# Patient Record
Sex: FEMALE | Race: OTHER
[De-identification: names, ages, dates, MRNs, and addresses within clinical notes are randomized per-mention and may not be internally consistent; named-entity substitution may affect disease eponyms.]

---

## 2013-06-06 VITALS — TEMPERATURE: 97.8 F | TEMPERATURE: 97.8 F

## 2017-03-05 ENCOUNTER — HOSPITAL ENCOUNTER (EMERGENCY)
Dept: HOSPITAL 58 - ED | Age: 47
Discharge: HOME | End: 2017-03-05

## 2017-03-05 VITALS — BODY MASS INDEX: 29.2 KG/M2

## 2017-03-05 VITALS — SYSTOLIC BLOOD PRESSURE: 123 MMHG | DIASTOLIC BLOOD PRESSURE: 86 MMHG | TEMPERATURE: 99.5 F

## 2017-03-05 DIAGNOSIS — J02.0: Primary | ICD-10-CM

## 2017-03-05 DIAGNOSIS — I88.9: ICD-10-CM

## 2017-03-05 LAB
FLU INTERNAL QC: (no result)
FLUAV AG NPH QL: NEGATIVE
FLUBV AG NPH QL: NEGATIVE

## 2017-03-05 PROCEDURE — 87804 INFLUENZA ASSAY W/OPTIC: CPT

## 2017-03-05 PROCEDURE — 99282 EMERGENCY DEPT VISIT SF MDM: CPT

## 2017-03-05 PROCEDURE — 87880 STREP A ASSAY W/OPTIC: CPT

## 2017-03-05 NOTE — ED.PDOC
General


ED Provider: 


Dr. JENNIFER YOUNG JR





Chief Complaint: Sore Throat


Stated Complaint: sore throat started yesterday[End]Sore throat since yesterday

, fever 99.5 90 16 97% 123/86 2/10


Time Seen by Physician: 09:39


Mode of Arrival: Walk-In


Information Source: Patient


Exam Limitations: No limitations


Primary Care Provider: 


DRAKE NICHOLSON





Nursing and Triage Documentation Reviewed and Agree: No





EENT Complaint Exam





- Throat Complaint/Exam


Onset/Duration: 1 day 


Symptoms Are: Still present


Timimg: Constant


Initial Severity: Moderate


Current Severity: Moderate


Aggravating: Reports: None


Alleviating: Reports: None


Associated Signs and Symptoms: Reports: Fever (right neck node)


Related History: Reports: Similar Episode


Uvula Midline: Yes


Exanthem: Present: Pharynx


Stridor Present: No


Sinus Tenderness Present: No


Tonsillar Hypertrophy Present: No


Tonsillar Exudate Present: No


Tamanna-tonsillar Swelling Present: No


Adenopathy Present: Yes


Splenomegaly Present: No


Differential Diagnoses: Pharyngitis





Review of Systems





- Review Of Systems


Constitutional: Reports: Fever, Malaise, Weakness


Eyes: Reports: No symptoms


Ears, Nose, Mouth, Throat: Reports: Mouth pain, Throat pain


Respiratory: Reports: No symptoms


Cardiac: Reports: No symptoms


GI: Reports: No symptoms


: Reports: No symptoms


Musculoskeletal: Reports: No symptoms


Neurological: Reports: No symptoms, Weakness


Endocrine: Reports: No symptoms


Hematologic/Lymphatic: Reports: No symptoms


All Other Systems: Other





Past Medical History





- Past Medical History


Endocrine: Reports: None


Cardiovascular: Reports: None


Respiratory: Reports: None


Hematological: Reports: None


Gastrointestinal: Reports: None


Genitourinary: Reports: None


Neuro/Psych: Reports: Anxiety, Depression


Musculoskeletal: Reports: None


Cancer: Reports: None


Last Menstrual Period: february





- Surgical History


General Surgical History: Reports: Unknown





- Family History


Family History: Reports: Unknown





- Social History


Smoking Status: Never smoker


Hx Substance Use: No


Alcohol Screening: None





- Immunizations


Tetanus Shot up to Date: No





Physical Exam





- Physical Exam


Appearance: Ill-appearing


Ill-appearing: Moderate


Pain Distress: Moderate


Eyes: DANIELLA, EOMI, Conjunctiva clear


ENT: Ears normal, Nose normal, Erythema


Neck: Supple (right submandibular node-tender)


Respiratory: Airway patent, Breath sounds clear, Breath sounds equal, 

Respirations nonlabored


Cardiovascular: RRR, Pulses normal, No rub, No murmur


GI/: Soft, Nontender, No masses, Bowel sounds normal, No Organomegaly


Musculoskeletal: Normal strength, ROM intact, No edema, No calf tenderness


Skin: Warm, Dry, Normal color


Neurological: Sensation intact, Motor intact, Reflexes intact, Cranial nerves 

intact, Alert, Oriented


Psychiatric: Affect appropriate, Mood appropriate





Critical Care Note





- Critical Care Note


Total Time (mins): 0





Course





- Course


Orders, Labs, Meds: 





Orders











 Category Date Time Status


 


 RAPID FLU A/B Stat LAB  03/04/17 09:20 Received


 


 STREP SCREEN Stat LAB  03/04/17 09:20 Received











Vital Signs: 





 











  Temp Pulse Resp BP Pulse Ox


 


 03/05/17 09:05  99.5 F  90  16  123/86  97














Departure





- Departure


Time of Disposition: 09:49


Disposition: HOME SELF-CARE


Discharge Problem: 


 Sore throat symptom, Lymphadenitis, Strep pharyngitis





Instructions:  Lymphadenopathy (ED), Strep Throat (ED)


Condition: Good


Pt referred to PMD for follow-up: Yes


Additional Instructions: 


Do not share food or drink 


avoid children until on antibiotic for full 24 hours


recheck PMD two weeks


antibiotic until gone


clindamycin antiobiotic


return if worse


Prescriptions: 


Naproxen [Naprosyn] 500 mg PO Q12HR PRN #30 tablet


 PRN Reason: PAIN


Clindamycin HCl 300 mg PO QID #40 capsule


Allergies/Adverse Reactions: 


Allergies





Penicillins Adverse Reaction (Verified 02/08/16 01:38)


 








Home Medications: 


Ambulatory Orders





Sertraline HCl [Zoloft] 25 mg PO DAILY 06/06/13 


Clindamycin HCl 300 mg PO QID #40 capsule 03/05/17 


Naproxen [Naprosyn] 500 mg PO Q12HR PRN #30 tablet 03/05/17

## 2017-05-01 ENCOUNTER — HOSPITAL ENCOUNTER (OUTPATIENT)
Dept: HOSPITAL 58 - LAB | Age: 47
Discharge: HOME | End: 2017-05-01
Attending: OPTOMETRIST

## 2017-05-01 VITALS — BODY MASS INDEX: 29.2 KG/M2

## 2017-05-01 DIAGNOSIS — H11.31: Primary | ICD-10-CM

## 2017-05-01 LAB
ALT SERPL-CCNC: 21 U/L (ref 12–78)
APTT PPP: 23.8 SEC (ref 23.9–40)
AST SERPL-CCNC: 16 U/L (ref 15–37)
BASOPHILS # BLD AUTO: 0.1 K/UL (ref 0–0.2)
BASOPHILS NFR BLD AUTO: 0.7 % (ref 0–3)
EOSINOPHIL # BLD AUTO: 0.1 K/UL (ref 0–0.7)
EOSINOPHIL NFR BLD AUTO: 1.7 % (ref 0–7)
HCT VFR BLD AUTO: 40.1 % (ref 37–47)
HGB BLD-MCNC: 13.5 G/DL (ref 12–16)
IMM GRANULOCYTES NFR BLD AUTO: 0.4 % (ref 0–5)
LYMPHOCYTES # SPEC AUTO: 2.3 K/UL (ref 0.6–3.4)
LYMPHOCYTES NFR BLD AUTO: 30.1 % (ref 10–50)
MCH RBC QN: 28.8 PG (ref 27–31)
MCHC RBC AUTO-ENTMCNC: 33.7 G/DL (ref 31.8–35.4)
MCV RBC: 85.5 FL (ref 81–99)
MONOCYTES # BLD AUTO: 0.6 K/UL (ref 0.4–2)
MONOCYTES NFR BLD AUTO: 7.6 % (ref 0–10)
NEUTROPHILS # BLD AUTO: 4.6 K/UL (ref 2–6.9)
NEUTROPHILS NFR BLD AUTO: 59.5 %
PLATELET # BLD AUTO: 206 10^3/UL (ref 140–440)
PROTHROMBIN TIME: 10.1 SEC (ref 9.3–11)
RBC # BLD AUTO: 4.69 10^6/UL (ref 4.2–5.4)
WBC # BLD AUTO: 7.64 K/UL (ref 4.6–10.2)

## 2017-05-01 PROCEDURE — 85302 CLOT INHIBIT PROT C ANTIGEN: CPT

## 2017-05-01 PROCEDURE — 85025 COMPLETE CBC W/AUTO DIFF WBC: CPT

## 2017-05-01 PROCEDURE — 85610 PROTHROMBIN TIME: CPT

## 2017-05-01 PROCEDURE — 85306 CLOT INHIBIT PROT S FREE: CPT

## 2017-05-01 PROCEDURE — 84450 TRANSFERASE (AST) (SGOT): CPT

## 2017-05-01 PROCEDURE — 36415 COLL VENOUS BLD VENIPUNCTURE: CPT

## 2017-05-01 PROCEDURE — 85305 CLOT INHIBIT PROT S TOTAL: CPT

## 2017-05-01 PROCEDURE — 84460 ALANINE AMINO (ALT) (SGPT): CPT

## 2017-05-01 PROCEDURE — 85730 THROMBOPLASTIN TIME PARTIAL: CPT

## 2017-05-03 LAB
PROT C AG ACT/NOR PPP IA: 76 % (ref 60–150)
PROT S AG ACT/NOR PPP IA: 136 % (ref 60–150)
PROT S FREE AG PPP IA-ACNC: 66 % (ref 57–157)

## 2017-06-12 RX ORDER — SERTRALINE HYDROCHLORIDE 25 MG/1
25 TABLET, FILM COATED ORAL DAILY
Qty: 30 TABLET | Refills: 0 | Status: SHIPPED | OUTPATIENT
Start: 2017-06-12 | End: 2017-06-26 | Stop reason: SDUPTHER

## 2017-06-26 ENCOUNTER — OFFICE VISIT (OUTPATIENT)
Dept: FAMILY MEDICINE CLINIC | Facility: CLINIC | Age: 47
End: 2017-06-26

## 2017-06-26 VITALS
OXYGEN SATURATION: 98 % | SYSTOLIC BLOOD PRESSURE: 120 MMHG | BODY MASS INDEX: 26.58 KG/M2 | WEIGHT: 150 LBS | HEIGHT: 63 IN | RESPIRATION RATE: 16 BRPM | HEART RATE: 62 BPM | DIASTOLIC BLOOD PRESSURE: 82 MMHG

## 2017-06-26 DIAGNOSIS — H53.9 VISUAL CHANGES: Primary | ICD-10-CM

## 2017-06-26 DIAGNOSIS — F32.89 OTHER DEPRESSION: ICD-10-CM

## 2017-06-26 PROBLEM — F32.A DEPRESSION: Status: ACTIVE | Noted: 2017-06-26

## 2017-06-26 PROCEDURE — 99213 OFFICE O/P EST LOW 20 MIN: CPT | Performed by: FAMILY MEDICINE

## 2017-06-26 RX ORDER — SERTRALINE HYDROCHLORIDE 25 MG/1
25 TABLET, FILM COATED ORAL DAILY
Qty: 30 TABLET | Refills: 5 | Status: SHIPPED | OUTPATIENT
Start: 2017-06-26 | End: 2018-03-23 | Stop reason: SDUPTHER

## 2017-06-26 NOTE — PROGRESS NOTES
"Subjective   Haley Jewell is a 46 y.o. female.     Chief Complaint   Patient presents with   • Eye Problem     pt states that when she is outside for long periods of time her eye gets very red and bloodshot.  she says that she has been to the eye doctor with no result.     • Med Refill     zoloft   • Insomnia     trouble sleeping        History of Present Illness     she is noting trouble with recurrent eye pain--she has been to [facility] for the same problem and had labs--she will devloped eye pain with redness..they did not find cause--she is tolerating zoloft for deprssion and sleeping well ..she was having trrouble sleeping from the eye pain      Current Outpatient Prescriptions:   •  sertraline (ZOLOFT) 25 MG tablet, Take 1 tablet by mouth Daily., Disp: 30 tablet, Rfl: 5  No Known Allergies    No past medical history on file.  No past surgical history on file.    Review of Systems   Constitutional: Negative.    HENT: Negative.    Eyes: Positive for pain and redness.   Respiratory: Negative.    Cardiovascular: Negative.    Gastrointestinal: Negative.    Genitourinary: Negative.    Musculoskeletal: Negative.    Skin: Negative.    Allergic/Immunologic: Negative.    Neurological: Negative.    Hematological: Negative.    Psychiatric/Behavioral: Positive for decreased concentration. Negative for self-injury and suicidal ideas.       Objective  /82  Pulse 62  Resp 16  Ht 63\" (160 cm)  Wt 150 lb (68 kg)  SpO2 98%  BMI 26.57 kg/m2  Physical Exam   Constitutional: She is oriented to person, place, and time. She appears well-developed and well-nourished.   HENT:   Head: Normocephalic and atraumatic.   Right Ear: External ear normal.   Left Ear: External ear normal.   Nose: Nose normal.   Mouth/Throat: Oropharynx is clear and moist.   Eyes: Conjunctivae and EOM are normal. Pupils are equal, round, and reactive to light.   Neck: Normal range of motion. Neck supple.   Cardiovascular: Normal rate, " regular rhythm, normal heart sounds and intact distal pulses.    Pulmonary/Chest: Effort normal and breath sounds normal.   Abdominal: Soft. Bowel sounds are normal.   Musculoskeletal: Normal range of motion.   Neurological: She is alert and oriented to person, place, and time. She has normal reflexes.   Skin: Skin is warm and dry.   Psychiatric: She has a normal mood and affect. Her behavior is normal. Judgment and thought content normal.   Nursing note and vitals reviewed.      Assessment/Plan   Haley was seen today for eye problem, med refill and insomnia.    Diagnoses and all orders for this visit:    Visual changes    Other depression    i suggested seeing dr fuentes for evaluation she is calling today           No orders of the defined types were placed in this encounter.      Follow up: 6 month(s)

## 2017-07-05 ENCOUNTER — OFFICE VISIT (OUTPATIENT)
Dept: FAMILY MEDICINE CLINIC | Facility: CLINIC | Age: 47
End: 2017-07-05

## 2017-07-05 VITALS
WEIGHT: 150 LBS | HEART RATE: 69 BPM | HEIGHT: 63 IN | BODY MASS INDEX: 26.58 KG/M2 | DIASTOLIC BLOOD PRESSURE: 84 MMHG | RESPIRATION RATE: 16 BRPM | OXYGEN SATURATION: 98 % | SYSTOLIC BLOOD PRESSURE: 126 MMHG

## 2017-07-05 DIAGNOSIS — L98.9 SKIN LESION OF LEFT ARM: Primary | ICD-10-CM

## 2017-07-05 DIAGNOSIS — D23.9 DERMATOFIBROMA: Primary | ICD-10-CM

## 2017-07-05 PROCEDURE — 11300 SHAVE SKIN LESION 0.5 CM/<: CPT | Performed by: FAMILY MEDICINE

## 2017-07-05 NOTE — PROGRESS NOTES
Haley has a darkening papular l;esion on the left forearm she desires removal--she understands risk of bleeding and infection but desire removal and consents--using 2cc of lidocaine without epi anesthesia is obtained--the 0.5cm lesion is removed in a shave fashion without borders--hemostasis was obtained uising electrocautery ..she tolerated it well--sterile dressing is applied and she is instructed in wound care--she will call in one week for path report

## 2017-07-07 LAB
DX ICD CODE: NORMAL
DX ICD CODE: NORMAL
PATH REPORT.FINAL DX SPEC: NORMAL
PATH REPORT.GROSS SPEC: NORMAL
PATH REPORT.RELEVANT HX SPEC: NORMAL
PATH REPORT.SITE OF ORIGIN SPEC: NORMAL
PATHOLOGIST NAME: NORMAL
PAYMENT PROCEDURE: NORMAL

## 2017-09-11 ENCOUNTER — APPOINTMENT (OUTPATIENT)
Dept: GENERAL RADIOLOGY | Facility: HOSPITAL | Age: 47
End: 2017-09-11

## 2017-09-11 ENCOUNTER — HOSPITAL ENCOUNTER (EMERGENCY)
Facility: HOSPITAL | Age: 47
Discharge: HOME | End: 2017-09-11
Admitting: EMERGENCY MEDICINE

## 2017-09-11 ENCOUNTER — APPOINTMENT (OUTPATIENT)
Dept: CT IMAGING | Facility: HOSPITAL | Age: 47
End: 2017-09-11

## 2017-09-11 VITALS
HEART RATE: 67 BPM | RESPIRATION RATE: 16 BRPM | HEIGHT: 60 IN | WEIGHT: 153.1 LBS | BODY MASS INDEX: 30.06 KG/M2 | DIASTOLIC BLOOD PRESSURE: 64 MMHG | SYSTOLIC BLOOD PRESSURE: 108 MMHG | OXYGEN SATURATION: 97 % | TEMPERATURE: 98 F

## 2017-09-11 DIAGNOSIS — H81.12 BPV (BENIGN POSITIONAL VERTIGO), LEFT: Primary | ICD-10-CM

## 2017-09-11 DIAGNOSIS — R01.1 CARDIAC MURMUR: ICD-10-CM

## 2017-09-11 LAB
ALBUMIN SERPL-MCNC: 4.6 G/DL (ref 3.5–5)
ALBUMIN/GLOB SERPL: 1.1 G/DL (ref 1.1–2.5)
ALP SERPL-CCNC: 89 U/L (ref 24–120)
ALT SERPL W P-5'-P-CCNC: 37 U/L (ref 0–54)
ANION GAP SERPL CALCULATED.3IONS-SCNC: 11 MMOL/L (ref 4–13)
AST SERPL-CCNC: 28 U/L (ref 7–45)
BACTERIA UR QL AUTO: ABNORMAL /HPF
BASOPHILS # BLD AUTO: 0.04 10*3/MM3 (ref 0–0.2)
BASOPHILS NFR BLD AUTO: 0.4 % (ref 0–2)
BILIRUB SERPL-MCNC: 0.5 MG/DL (ref 0.1–1)
BILIRUB UR QL STRIP: NEGATIVE
BUN BLD-MCNC: 10 MG/DL (ref 5–21)
BUN/CREAT SERPL: 17.5 (ref 7–25)
CALCIUM SPEC-SCNC: 9.4 MG/DL (ref 8.4–10.4)
CHLORIDE SERPL-SCNC: 108 MMOL/L (ref 98–110)
CLARITY UR: CLEAR
CO2 SERPL-SCNC: 23 MMOL/L (ref 24–31)
COLOR UR: YELLOW
CREAT BLD-MCNC: 0.57 MG/DL (ref 0.5–1.4)
DEPRECATED RDW RBC AUTO: 42.3 FL (ref 40–54)
EOSINOPHIL # BLD AUTO: 0.12 10*3/MM3 (ref 0–0.7)
EOSINOPHIL NFR BLD AUTO: 1.3 % (ref 0–4)
ERYTHROCYTE [DISTWIDTH] IN BLOOD BY AUTOMATED COUNT: 13.5 % (ref 12–15)
GFR SERPL CREATININE-BSD FRML MDRD: 115 ML/MIN/1.73
GFR SERPL CREATININE-BSD FRML MDRD: 140 ML/MIN/1.73
GLOBULIN UR ELPH-MCNC: 4.2 GM/DL
GLUCOSE BLD-MCNC: 92 MG/DL (ref 70–100)
GLUCOSE UR STRIP-MCNC: NEGATIVE MG/DL
HCG SERPL QL: NEGATIVE
HCT VFR BLD AUTO: 41.9 % (ref 37–47)
HGB BLD-MCNC: 13.9 G/DL (ref 12–16)
HGB UR QL STRIP.AUTO: NEGATIVE
HYALINE CASTS UR QL AUTO: ABNORMAL /LPF
IMM GRANULOCYTES # BLD: 0.03 10*3/MM3 (ref 0–0.03)
IMM GRANULOCYTES NFR BLD: 0.3 % (ref 0–5)
KETONES UR QL STRIP: NEGATIVE
LEUKOCYTE ESTERASE UR QL STRIP.AUTO: ABNORMAL
LYMPHOCYTES # BLD AUTO: 2.37 10*3/MM3 (ref 0.72–4.86)
LYMPHOCYTES NFR BLD AUTO: 26.3 % (ref 15–45)
MCH RBC QN AUTO: 28.7 PG (ref 28–32)
MCHC RBC AUTO-ENTMCNC: 33.2 G/DL (ref 33–36)
MCV RBC AUTO: 86.4 FL (ref 82–98)
MONOCYTES # BLD AUTO: 0.61 10*3/MM3 (ref 0.19–1.3)
MONOCYTES NFR BLD AUTO: 6.8 % (ref 4–12)
NEUTROPHILS # BLD AUTO: 5.84 10*3/MM3 (ref 1.87–8.4)
NEUTROPHILS NFR BLD AUTO: 64.9 % (ref 39–78)
NITRITE UR QL STRIP: NEGATIVE
PH UR STRIP.AUTO: 7 [PH] (ref 5–8)
PLATELET # BLD AUTO: 223 10*3/MM3 (ref 130–400)
PMV BLD AUTO: 12.7 FL (ref 6–12)
POTASSIUM BLD-SCNC: 3.9 MMOL/L (ref 3.5–5.3)
PROT SERPL-MCNC: 8.8 G/DL (ref 6.3–8.7)
PROT UR QL STRIP: NEGATIVE
RBC # BLD AUTO: 4.85 10*6/MM3 (ref 4.2–5.4)
RBC # UR: ABNORMAL /HPF
REF LAB TEST METHOD: ABNORMAL
SODIUM BLD-SCNC: 142 MMOL/L (ref 135–145)
SP GR UR STRIP: 1.01 (ref 1–1.03)
SQUAMOUS #/AREA URNS HPF: ABNORMAL /HPF
T3FREE SERPL-MCNC: 3.39 PG/ML (ref 2.77–5.27)
T4 FREE SERPL-MCNC: 1.1 NG/DL (ref 0.78–2.19)
TROPONIN I SERPL-MCNC: <0.012 NG/ML (ref 0–0.03)
TSH SERPL DL<=0.05 MIU/L-ACNC: 1.24 MIU/ML (ref 0.47–4.68)
UROBILINOGEN UR QL STRIP: ABNORMAL
WBC NRBC COR # BLD: 9.01 10*3/MM3 (ref 4.8–10.8)
WBC UR QL AUTO: ABNORMAL /HPF

## 2017-09-11 PROCEDURE — 85025 COMPLETE CBC W/AUTO DIFF WBC: CPT | Performed by: EMERGENCY MEDICINE

## 2017-09-11 PROCEDURE — 87086 URINE CULTURE/COLONY COUNT: CPT | Performed by: EMERGENCY MEDICINE

## 2017-09-11 PROCEDURE — 25010000002 ONDANSETRON PER 1 MG: Performed by: EMERGENCY MEDICINE

## 2017-09-11 PROCEDURE — 70450 CT HEAD/BRAIN W/O DYE: CPT

## 2017-09-11 PROCEDURE — 84484 ASSAY OF TROPONIN QUANT: CPT | Performed by: EMERGENCY MEDICINE

## 2017-09-11 PROCEDURE — 84703 CHORIONIC GONADOTROPIN ASSAY: CPT | Performed by: EMERGENCY MEDICINE

## 2017-09-11 PROCEDURE — 71010 HC CHEST PA OR AP: CPT

## 2017-09-11 PROCEDURE — 25010000002 DIAZEPAM PER 5 MG: Performed by: EMERGENCY MEDICINE

## 2017-09-11 PROCEDURE — 25010000002 METHYLPREDNISOLONE PER 125 MG: Performed by: PHYSICIAN ASSISTANT

## 2017-09-11 PROCEDURE — 93010 ELECTROCARDIOGRAM REPORT: CPT | Performed by: INTERNAL MEDICINE

## 2017-09-11 PROCEDURE — 93005 ELECTROCARDIOGRAM TRACING: CPT | Performed by: EMERGENCY MEDICINE

## 2017-09-11 PROCEDURE — 96374 THER/PROPH/DIAG INJ IV PUSH: CPT

## 2017-09-11 PROCEDURE — 80053 COMPREHEN METABOLIC PANEL: CPT | Performed by: EMERGENCY MEDICINE

## 2017-09-11 PROCEDURE — 81001 URINALYSIS AUTO W/SCOPE: CPT | Performed by: EMERGENCY MEDICINE

## 2017-09-11 PROCEDURE — 84443 ASSAY THYROID STIM HORMONE: CPT | Performed by: PHYSICIAN ASSISTANT

## 2017-09-11 PROCEDURE — 96375 TX/PRO/DX INJ NEW DRUG ADDON: CPT

## 2017-09-11 PROCEDURE — 99284 EMERGENCY DEPT VISIT MOD MDM: CPT

## 2017-09-11 PROCEDURE — 84481 FREE ASSAY (FT-3): CPT | Performed by: PHYSICIAN ASSISTANT

## 2017-09-11 PROCEDURE — 84439 ASSAY OF FREE THYROXINE: CPT | Performed by: PHYSICIAN ASSISTANT

## 2017-09-11 RX ORDER — METHYLPREDNISOLONE SODIUM SUCCINATE 125 MG/2ML
125 INJECTION, POWDER, LYOPHILIZED, FOR SOLUTION INTRAMUSCULAR; INTRAVENOUS ONCE
Status: COMPLETED | OUTPATIENT
Start: 2017-09-11 | End: 2017-09-11

## 2017-09-11 RX ORDER — METHYLPREDNISOLONE 4 MG/1
TABLET ORAL
Qty: 21 EACH | Refills: 0 | Status: SHIPPED | OUTPATIENT
Start: 2017-09-11 | End: 2018-01-11

## 2017-09-11 RX ORDER — METHYLPREDNISOLONE SODIUM SUCCINATE 125 MG/2ML
125 INJECTION, POWDER, LYOPHILIZED, FOR SOLUTION INTRAMUSCULAR; INTRAVENOUS ONCE
Status: DISCONTINUED | OUTPATIENT
Start: 2017-09-11 | End: 2017-09-11

## 2017-09-11 RX ORDER — MECLIZINE HYDROCHLORIDE 25 MG/1
25 TABLET ORAL 3 TIMES DAILY PRN
Qty: 90 TABLET | Refills: 0 | Status: SHIPPED | OUTPATIENT
Start: 2017-09-11 | End: 2018-08-21

## 2017-09-11 RX ORDER — DIAZEPAM 5 MG/ML
5 INJECTION, SOLUTION INTRAMUSCULAR; INTRAVENOUS ONCE
Status: COMPLETED | OUTPATIENT
Start: 2017-09-11 | End: 2017-09-11

## 2017-09-11 RX ORDER — ONDANSETRON 2 MG/ML
4 INJECTION INTRAMUSCULAR; INTRAVENOUS ONCE
Status: COMPLETED | OUTPATIENT
Start: 2017-09-11 | End: 2017-09-11

## 2017-09-11 RX ORDER — DIAZEPAM 5 MG/1
5 TABLET ORAL EVERY 6 HOURS PRN
Qty: 15 TABLET | Refills: 0 | Status: SHIPPED | OUTPATIENT
Start: 2017-09-11 | End: 2018-08-21

## 2017-09-11 RX ADMIN — METHYLPREDNISOLONE SODIUM SUCCINATE 125 MG: 125 INJECTION, POWDER, FOR SOLUTION INTRAMUSCULAR; INTRAVENOUS at 10:54

## 2017-09-11 RX ADMIN — DIAZEPAM 5 MG: 5 INJECTION, SOLUTION INTRAMUSCULAR; INTRAVENOUS at 10:45

## 2017-09-11 RX ADMIN — ONDANSETRON 4 MG: 2 INJECTION INTRAMUSCULAR; INTRAVENOUS at 10:39

## 2017-09-11 NOTE — ED PROVIDER NOTES
"Subjective   Patient is a 46 y.o. female presenting with dizziness.   Dizziness     Patient is a pleasant 46-year-old  female with chief complaint of dizziness and nausea.  Her  and daughter presents to ED with her.  She speaks fair English as well and her daughter translates.  For the past 5 days, she has felt vertiginous symptoms when she ambulates and particularly when she lays on her left side.  She has hadnausea but has not progressed to vomiting.  The patient feels fine whenever she lies still.  The patient describes it very difficult for her to walk whenever she is feeling dizzy.  She has had similar presentations intermittently for the past several years.  Her last major episode was 2 years ago.  She had seen a specialist in the past and was taught the Paolo-Hallpike maneuver.  She describes that usually resolves her symptoms.  However, her symptoms have worsened despite this.    The patient denies any fever.  She denies any diplopia or going blind.  She complains of several near syncopal episodes.  She denies any recent illnesses or head congestion the past month.  She denies new cough.  She denies any chest pain, pressure, or tightness.  She denies shortness of breath.  Aside from her Zoloft, she is not taking any other medications.    The patient has completed a stress test 2 years ago as ordered by her primary care physician.  She describes the results were normal.  This was completed due to \"an anxiety attack.\"    Review of Systems   Constitutional: Positive for activity change, appetite change and fatigue. Negative for chills, diaphoresis and fever.   HENT: Negative.    Eyes: Negative.    Respiratory: Negative.  Negative for cough, chest tightness and stridor.    Cardiovascular: Negative.    Gastrointestinal: Negative.    Genitourinary: Negative.    Musculoskeletal: Positive for gait problem.   Neurological: Positive for dizziness.   Psychiatric/Behavioral: Negative.    All other systems " "reviewed and are negative.      Past Medical History:   Diagnosis Date   • Depression    • Vertigo        Allergies   Allergen Reactions   • Penicillins        History reviewed. No pertinent surgical history.    History reviewed. No pertinent family history.    Social History     Social History   • Marital status:      Spouse name: N/A   • Number of children: N/A   • Years of education: N/A     Social History Main Topics   • Smoking status: Never Smoker   • Smokeless tobacco: None   • Alcohol use No   • Drug use: No   • Sexual activity: Not Asked     Other Topics Concern   • None     Social History Narrative   • None       Prior to Admission medications    Medication Sig Start Date End Date Taking? Authorizing Provider   sertraline (ZOLOFT) 25 MG tablet Take 1 tablet by mouth Daily. 6/26/17   Harvey Mora MD       Medications   ondansetron (ZOFRAN) injection 4 mg (4 mg Intravenous Given 9/11/17 1039)   diazePAM (VALIUM) injection 5 mg (5 mg Intravenous Given 9/11/17 1045)   methylPREDNISolone sodium succinate (SOLU-Medrol) injection 125 mg (125 mg Intravenous Given 9/11/17 1054)       /88 (BP Location: Right arm, Patient Position: Lying)  Pulse 94  Temp 97.5 °F (36.4 °C)  Resp 21  Ht 60\" (152.4 cm)  Wt 153 lb 1.6 oz (69.4 kg)  SpO2 100%  BMI 29.9 kg/m2      Objective   Physical Exam   Constitutional: She is oriented to person, place, and time. She appears well-developed and well-nourished.  Non-toxic appearance. No distress.   HENT:   Head: Normocephalic and atraumatic.   Nose: Nose normal.   Mouth/Throat: Uvula is midline, oropharynx is clear and moist and mucous membranes are normal.   Eyes: Conjunctivae, EOM and lids are normal. Pupils are equal, round, and reactive to light. Lids are everted and swept, no foreign bodies found.   Neck: Trachea normal, normal range of motion, full passive range of motion without pain and phonation normal. Neck supple. Normal carotid pulses and no JVD " present. Carotid bruit is not present. No rigidity.   Cardiovascular: Normal rate, regular rhythm, intact distal pulses and normal pulses.    Murmur heard.   Systolic murmur is present with a grade of 2/6   Pulmonary/Chest: Effort normal and breath sounds normal. No stridor. No apnea and no tachypnea. No respiratory distress.   Abdominal: Soft. Normal appearance, normal aorta and bowel sounds are normal. There is no hepatosplenomegaly. No hernia.   Musculoskeletal: Normal range of motion.       Vascular Status -  Her exam exhibits right foot vasculature normal. Her exam exhibits no right foot edema. Her exam exhibits left foot vasculature normal. Her exam exhibits no left foot edema.  Neurological: She is alert and oriented to person, place, and time. She has normal strength and normal reflexes. She displays normal reflexes. No cranial nerve deficit or sensory deficit. Coordination and gait normal. GCS eye subscore is 4. GCS verbal subscore is 5. GCS motor subscore is 6.   Cranial nerve evaluation:  No aphasia.  PERRLA. Normal visual fields. Normal smooth eye movement.   No facial assymetry.  Tongue is midline with normal gag reflex.   Symmetrical/normal sternocleidomastoid movement.   No pronator drift.  No sensory deficits.  No motor deficits.   Symptomatic when moving her head to the left.   No ataxia.    Skin: Skin is warm, dry and intact. She is not diaphoretic. No pallor.   Psychiatric: She has a normal mood and affect. Her speech is normal and behavior is normal.   Nursing note and vitals reviewed.      Procedures         Lab Results (last 24 hours)     Procedure Component Value Units Date/Time    Urinalysis With / Culture If Indicated [[id number]]  (Abnormal) Collected:  09/11/17 1025    Specimen:  Urine from Urine, Clean Catch Updated:  09/11/17 1109     Color, UA Yellow     Appearance, UA Clear     pH, UA 7.0     Specific Gravity, UA 1.007     Glucose, UA Negative     Ketones, UA Negative     Bilirubin, UA  Negative     Blood, UA Negative     Protein, UA Negative     Leuk Esterase, UA Small (1+) (A)     Nitrite, UA Negative     Urobilinogen, UA 0.2 E.U./dL    Urine Culture [[id number]] Collected:  09/11/17 1025    Specimen:  Urine from Urine, Clean Catch Updated:  09/11/17 1106    Urinalysis, Microscopic Only [[id number]]  (Abnormal) Collected:  09/11/17 1025    Specimen:  Urine from Urine, Clean Catch Updated:  09/11/17 1109     RBC, UA 0-2 (A) /HPF      WBC, UA 6-12 (A) /HPF      Bacteria, UA 1+ (A) /HPF      Squamous Epithelial Cells, UA 3-6 (A) /HPF      Hyaline Casts, UA 3-6 /LPF      Methodology Automated Microscopy    CBC & Differential [[id number]] Collected:  09/11/17 1030    Specimen:  Blood Updated:  09/11/17 1107    Narrative:       The following orders were created for panel order CBC & Differential.  Procedure                               Abnormality         Status                     ---------                               -----------         ------                     CBC Auto Differential[[id number]]        Abnormal            Final result                 Please view results for these tests on the individual orders.    Comprehensive Metabolic Panel [[id number]]  (Abnormal) Collected:  09/11/17 1030    Specimen:  Blood from Arm, Left Updated:  09/11/17 1119     Glucose 92 mg/dL      BUN 10 mg/dL      Creatinine 0.57 mg/dL      Sodium 142 mmol/L      Potassium 3.9 mmol/L      Chloride 108 mmol/L      CO2 23.0 (L) mmol/L      Calcium 9.4 mg/dL      Total Protein 8.8 (H) g/dL      Albumin 4.60 g/dL      ALT (SGPT) 37 U/L      AST (SGOT) 28 U/L      Alkaline Phosphatase 89 U/L      Total Bilirubin 0.5 mg/dL      eGFR Non African Amer 115 mL/min/1.73      eGFR  African Amer 140 mL/min/1.73      Globulin 4.2 gm/dL      A/G Ratio 1.1 g/dL      BUN/Creatinine Ratio 17.5     Anion Gap 11.0 mmol/L     Troponin [[id number]]  (Normal) Collected:  09/11/17 1030    Specimen:  Blood from Arm, Left Updated:  09/11/17 1128      Troponin I <0.012 ng/mL     hCG, Serum, Qualitative [[id number]]  (Normal) Collected:  09/11/17 1030    Specimen:  Blood from Arm, Left Updated:  09/11/17 1113     HCG Qualitative Negative    CBC Auto Differential [[id number]]  (Abnormal) Collected:  09/11/17 1030    Specimen:  Blood from Arm, Left Updated:  09/11/17 1107     WBC 9.01 10*3/mm3      RBC 4.85 10*6/mm3      Hemoglobin 13.9 g/dL      Hematocrit 41.9 %      MCV 86.4 fL      MCH 28.7 pg      MCHC 33.2 g/dL      RDW 13.5 %      RDW-SD 42.3 fl      MPV 12.7 (H) fL      Platelets 223 10*3/mm3      Neutrophil % 64.9 %      Lymphocyte % 26.3 %      Monocyte % 6.8 %      Eosinophil % 1.3 %      Basophil % 0.4 %      Immature Grans % 0.3 %      Neutrophils, Absolute 5.84 10*3/mm3      Lymphocytes, Absolute 2.37 10*3/mm3      Monocytes, Absolute 0.61 10*3/mm3      Eosinophils, Absolute 0.12 10*3/mm3      Basophils, Absolute 0.04 10*3/mm3      Immature Grans, Absolute 0.03 10*3/mm3     TSH [[id number]]  (Normal) Collected:  09/11/17 1030    Specimen:  Blood from Arm, Left Updated:  09/11/17 1145     TSH 1.240 mIU/mL     T3, Free [[id number]]  (Normal) Collected:  09/11/17 1030    Specimen:  Blood from Arm, Left Updated:  09/11/17 1132     T3, Free 3.39 pg/mL     T4, Free [[id number]]  (Normal) Collected:  09/11/17 1030    Specimen:  Blood from Arm, Left Updated:  09/11/17 1132     Free T4 1.10 ng/dL           Ct Head Without Contrast    Result Date: 9/11/2017  Narrative: EXAMINATION: CT HEAD WO CONTRAST- 9/11/2017 12:06 PM CDT  HISTORY: Acute onset dizziness.  DOSE: 697 mGycm (Automatic exposure control technique was implemented in an effort to keep the radiation dose as low as possible without compromising image quality)  REPORT: Multiple axial images of the head were obtained without intravenous contrast. Reconstructed coronal and sagittal images are also reviewed. There are no comparison studies. No evidence of intracranial hemorrhage, mass or mass-effect is  identified. The ventricles and basal cisterns appear within normal limits. Bone windows show no skull fracture.      Impression: Impression: Negative unenhanced CT of the head.     This report was finalized on 09/11/2017 12:08 by Dr. Az Bernstein MD.    Xr Chest 1 View    Result Date: 9/11/2017  Narrative: XR CHEST 1 VW- 9/11/2017 11:16 AM EDT  HISTORY: Dizziness   COMPARISON: None.  FINDINGS: The lungs are clear. The cardiomediastinal silhouette and pulmonary vascularity are within normal limits.  The osseous structures and surrounding soft tissues demonstrate no acute abnormality.      Impression: 1. No radiographic evidence of acute cardiopulmonary process.   This report was finalized on 09/11/2017 10:52 by Dr. Kaiden Marcus MD.      ED Course  ED Course          MDM    The patient has been reassessed.  She describes feeling significantly better.  She is able to ambulate comfortably. She can lie on her left side.  I have informed family and patient to follow-up with her primary care physician and potentially ENT specialist since this is a recurring issue.  Patient and family voiced understanding.        Final diagnoses:   BPV (benign positional vertigo), left   Cardiac murmur          Thu-GERSON Ramesh  09/11/17 1231

## 2017-09-13 LAB — BACTERIA SPEC AEROBE CULT: ABNORMAL

## 2017-11-14 ENCOUNTER — TELEPHONE (OUTPATIENT)
Dept: FAMILY MEDICINE CLINIC | Facility: CLINIC | Age: 47
End: 2017-11-14

## 2017-11-19 ENCOUNTER — HOSPITAL ENCOUNTER (EMERGENCY)
Facility: HOSPITAL | Age: 47
Discharge: HOME | End: 2017-11-19
Admitting: EMERGENCY MEDICINE

## 2017-11-19 ENCOUNTER — APPOINTMENT (OUTPATIENT)
Dept: CT IMAGING | Facility: HOSPITAL | Age: 47
End: 2017-11-19

## 2017-11-19 VITALS
RESPIRATION RATE: 16 BRPM | BODY MASS INDEX: 27.29 KG/M2 | TEMPERATURE: 97.9 F | SYSTOLIC BLOOD PRESSURE: 108 MMHG | DIASTOLIC BLOOD PRESSURE: 75 MMHG | OXYGEN SATURATION: 97 % | WEIGHT: 154 LBS | HEART RATE: 69 BPM | HEIGHT: 63 IN

## 2017-11-19 DIAGNOSIS — S05.02XA ABRASION OF LEFT CORNEA, INITIAL ENCOUNTER: ICD-10-CM

## 2017-11-19 DIAGNOSIS — K64.9 HEMORRHOIDS, UNSPECIFIED HEMORRHOID TYPE: ICD-10-CM

## 2017-11-19 DIAGNOSIS — K62.5 RECTAL BLEEDING: Primary | ICD-10-CM

## 2017-11-19 LAB
ABO GROUP BLD: NORMAL
ALBUMIN SERPL-MCNC: 4.6 G/DL (ref 3.5–5)
ALBUMIN/GLOB SERPL: 1.2 G/DL (ref 1.1–2.5)
ALP SERPL-CCNC: 78 U/L (ref 24–120)
ALT SERPL W P-5'-P-CCNC: 39 U/L (ref 0–54)
AMYLASE SERPL-CCNC: 87 U/L (ref 30–110)
ANION GAP SERPL CALCULATED.3IONS-SCNC: 9 MMOL/L (ref 4–13)
APTT PPP: 30.7 SECONDS (ref 24.1–34.8)
AST SERPL-CCNC: 30 U/L (ref 7–45)
B-HCG UR QL: NEGATIVE
BACTERIA UR QL AUTO: ABNORMAL /HPF
BASOPHILS # BLD AUTO: 0.06 10*3/MM3 (ref 0–0.2)
BASOPHILS NFR BLD AUTO: 0.6 % (ref 0–2)
BILIRUB SERPL-MCNC: 0.4 MG/DL (ref 0.1–1)
BILIRUB UR QL STRIP: NEGATIVE
BLD GP AB SCN SERPL QL: NEGATIVE
BUN BLD-MCNC: 11 MG/DL (ref 5–21)
BUN/CREAT SERPL: 16.4 (ref 7–25)
CALCIUM SPEC-SCNC: 9.2 MG/DL (ref 8.4–10.4)
CHLORIDE SERPL-SCNC: 104 MMOL/L (ref 98–110)
CLARITY UR: CLEAR
CO2 SERPL-SCNC: 28 MMOL/L (ref 24–31)
COLOR UR: YELLOW
CREAT BLD-MCNC: 0.67 MG/DL (ref 0.5–1.4)
CRP SERPL-MCNC: 1.3 MG/DL (ref 0–0.99)
D-LACTATE SERPL-SCNC: 2.1 MMOL/L (ref 0.5–2)
DEPRECATED RDW RBC AUTO: 42.6 FL (ref 40–54)
DEVELOPER EXPIRATION DATE: ABNORMAL
DEVELOPER LOT NUMBER: 113
EOSINOPHIL # BLD AUTO: 0.13 10*3/MM3 (ref 0–0.7)
EOSINOPHIL NFR BLD AUTO: 1.4 % (ref 0–4)
ERYTHROCYTE [DISTWIDTH] IN BLOOD BY AUTOMATED COUNT: 13.5 % (ref 12–15)
EXPIRATION DATE: ABNORMAL
FECAL OCCULT BLOOD SCREEN, POC: POSITIVE
FLUAV AG NPH QL: NEGATIVE
FLUBV AG NPH QL IA: NEGATIVE
GFR SERPL CREATININE-BSD FRML MDRD: 115 ML/MIN/1.73
GFR SERPL CREATININE-BSD FRML MDRD: 95 ML/MIN/1.73
GLOBULIN UR ELPH-MCNC: 3.8 GM/DL
GLUCOSE BLD-MCNC: 94 MG/DL (ref 70–100)
GLUCOSE UR STRIP-MCNC: NEGATIVE MG/DL
HCT VFR BLD AUTO: 39.9 % (ref 37–47)
HGB BLD-MCNC: 13.3 G/DL (ref 12–16)
HGB UR QL STRIP.AUTO: NEGATIVE
HOLD SPECIMEN: NORMAL
HYALINE CASTS UR QL AUTO: ABNORMAL /LPF
IMM GRANULOCYTES # BLD: 0.03 10*3/MM3 (ref 0–0.03)
IMM GRANULOCYTES NFR BLD: 0.3 % (ref 0–5)
INR PPP: 0.94 (ref 0.91–1.09)
INTERNAL NEGATIVE CONTROL: NEGATIVE
INTERNAL POSITIVE CONTROL: POSITIVE
KETONES UR QL STRIP: NEGATIVE
LEUKOCYTE ESTERASE UR QL STRIP.AUTO: ABNORMAL
LIPASE SERPL-CCNC: 165 U/L (ref 23–203)
LYMPHOCYTES # BLD AUTO: 2.78 10*3/MM3 (ref 0.72–4.86)
LYMPHOCYTES NFR BLD AUTO: 29.5 % (ref 15–45)
Lab: 113
Lab: NORMAL
MCH RBC QN AUTO: 28.7 PG (ref 28–32)
MCHC RBC AUTO-ENTMCNC: 33.3 G/DL (ref 33–36)
MCV RBC AUTO: 86.2 FL (ref 82–98)
MONOCYTES # BLD AUTO: 0.69 10*3/MM3 (ref 0.19–1.3)
MONOCYTES NFR BLD AUTO: 7.3 % (ref 4–12)
NEGATIVE CONTROL: NEGATIVE
NEUTROPHILS # BLD AUTO: 5.72 10*3/MM3 (ref 1.87–8.4)
NEUTROPHILS NFR BLD AUTO: 60.9 % (ref 39–78)
NITRITE UR QL STRIP: NEGATIVE
PH UR STRIP.AUTO: <=5 [PH] (ref 5–8)
PLATELET # BLD AUTO: 237 10*3/MM3 (ref 130–400)
PMV BLD AUTO: 12.3 FL (ref 6–12)
POSITIVE CONTROL: POSITIVE
POTASSIUM BLD-SCNC: 3.7 MMOL/L (ref 3.5–5.3)
PROT SERPL-MCNC: 8.4 G/DL (ref 6.3–8.7)
PROT UR QL STRIP: NEGATIVE
PROTHROMBIN TIME: 12.8 SECONDS (ref 11.9–14.6)
RBC # BLD AUTO: 4.63 10*6/MM3 (ref 4.2–5.4)
RBC # UR: ABNORMAL /HPF
REF LAB TEST METHOD: ABNORMAL
RH BLD: POSITIVE
S PYO AG THROAT QL: NEGATIVE
SODIUM BLD-SCNC: 141 MMOL/L (ref 135–145)
SP GR UR STRIP: 1.02 (ref 1–1.03)
SQUAMOUS #/AREA URNS HPF: ABNORMAL /HPF
UROBILINOGEN UR QL STRIP: ABNORMAL
WBC NRBC COR # BLD: 9.41 10*3/MM3 (ref 4.8–10.8)
WBC UR QL AUTO: ABNORMAL /HPF

## 2017-11-19 PROCEDURE — 85610 PROTHROMBIN TIME: CPT | Performed by: NURSE PRACTITIONER

## 2017-11-19 PROCEDURE — 74177 CT ABD & PELVIS W/CONTRAST: CPT

## 2017-11-19 PROCEDURE — 86900 BLOOD TYPING SEROLOGIC ABO: CPT | Performed by: NURSE PRACTITIONER

## 2017-11-19 PROCEDURE — 83690 ASSAY OF LIPASE: CPT | Performed by: NURSE PRACTITIONER

## 2017-11-19 PROCEDURE — 87081 CULTURE SCREEN ONLY: CPT | Performed by: NURSE PRACTITIONER

## 2017-11-19 PROCEDURE — 85730 THROMBOPLASTIN TIME PARTIAL: CPT | Performed by: NURSE PRACTITIONER

## 2017-11-19 PROCEDURE — 99285 EMERGENCY DEPT VISIT HI MDM: CPT

## 2017-11-19 PROCEDURE — 96360 HYDRATION IV INFUSION INIT: CPT

## 2017-11-19 PROCEDURE — 83605 ASSAY OF LACTIC ACID: CPT | Performed by: NURSE PRACTITIONER

## 2017-11-19 PROCEDURE — 86850 RBC ANTIBODY SCREEN: CPT | Performed by: NURSE PRACTITIONER

## 2017-11-19 PROCEDURE — 80053 COMPREHEN METABOLIC PANEL: CPT | Performed by: NURSE PRACTITIONER

## 2017-11-19 PROCEDURE — 87880 STREP A ASSAY W/OPTIC: CPT | Performed by: NURSE PRACTITIONER

## 2017-11-19 PROCEDURE — 85025 COMPLETE CBC W/AUTO DIFF WBC: CPT | Performed by: NURSE PRACTITIONER

## 2017-11-19 PROCEDURE — 86901 BLOOD TYPING SEROLOGIC RH(D): CPT | Performed by: NURSE PRACTITIONER

## 2017-11-19 PROCEDURE — 81001 URINALYSIS AUTO W/SCOPE: CPT | Performed by: NURSE PRACTITIONER

## 2017-11-19 PROCEDURE — 82270 OCCULT BLOOD FECES: CPT | Performed by: NURSE PRACTITIONER

## 2017-11-19 PROCEDURE — 0 IOPAMIDOL 61 % SOLUTION: Performed by: NURSE PRACTITIONER

## 2017-11-19 PROCEDURE — 87804 INFLUENZA ASSAY W/OPTIC: CPT | Performed by: NURSE PRACTITIONER

## 2017-11-19 PROCEDURE — 86140 C-REACTIVE PROTEIN: CPT | Performed by: NURSE PRACTITIONER

## 2017-11-19 PROCEDURE — 82150 ASSAY OF AMYLASE: CPT | Performed by: NURSE PRACTITIONER

## 2017-11-19 RX ORDER — TETRACAINE HYDROCHLORIDE 5 MG/ML
2 SOLUTION OPHTHALMIC ONCE
Status: COMPLETED | OUTPATIENT
Start: 2017-11-19 | End: 2017-11-19

## 2017-11-19 RX ORDER — SODIUM CHLORIDE 0.9 % (FLUSH) 0.9 %
10 SYRINGE (ML) INJECTION AS NEEDED
Status: DISCONTINUED | OUTPATIENT
Start: 2017-11-19 | End: 2017-11-19 | Stop reason: HOSPADM

## 2017-11-19 RX ORDER — ERYTHROMYCIN 5 MG/G
OINTMENT OPHTHALMIC
Qty: 1 G | Refills: 0 | Status: SHIPPED | OUTPATIENT
Start: 2017-11-19 | End: 2017-11-26

## 2017-11-19 RX ORDER — METRONIDAZOLE 500 MG/1
500 TABLET ORAL 3 TIMES DAILY
Qty: 15 TABLET | Refills: 0 | Status: SHIPPED | OUTPATIENT
Start: 2017-11-19 | End: 2017-11-24

## 2017-11-19 RX ADMIN — TETRACAINE HYDROCHLORIDE 2 DROP: 5 SOLUTION OPHTHALMIC at 16:01

## 2017-11-19 RX ADMIN — IOPAMIDOL 100 ML: 612 INJECTION, SOLUTION INTRAVENOUS at 17:02

## 2017-11-19 RX ADMIN — FLUORESCEIN SODIUM 1 STRIP: 1 STRIP OPHTHALMIC at 16:01

## 2017-11-19 RX ADMIN — SODIUM CHLORIDE 1000 ML: 9 INJECTION, SOLUTION INTRAVENOUS at 15:44

## 2017-11-19 NOTE — ED NOTES
Had to print the label in order to get the order off, but lab was only type and screen Spoke with Neva Lozoya RN  11/19/17 [id number]

## 2017-11-19 NOTE — ED NOTES
C/o left eye pain onset Thursday pain occurs when touching eye. Sclera red and c/o sore throat onset last night and rectal bleeding dark and states a lot and blood clots.Pt stated she has had hemorrhoids with birth of son, but none since then. Onset 1 week ago. Pt states this happened before last summer but only lasted 3 days. No c/o abd pain or n/v.     Yazmin Lozoya RN  11/19/17 [id number]       Yazmin Lozoya RN  11/19/17 [id number]

## 2017-11-19 NOTE — ED NOTES
Rectal exam done per [facility] Winter and obtained poc occult stool. External hemorrhoids noted, none thrombosed.     Yazmin Lozoya RN  11/19/17 [id number]

## 2017-11-19 NOTE — ED PROVIDER NOTES
"Subjective   HPI Comments: Patient is a 47-year-old  female since ER today with several complaints.  Patient reports that she has been having rectal bleeding intermittently bowel days.  He states that at times she does pass some large \"blood clots.  Patient states this occurred previously in the summer and that this resolved spontaneously after 3 days.  Patient denies any abdominal pain.  She does have a history of this.  Patient states that she feels as though she is passing large blood she has a bowel movement.  He also reports that she is having a sore throat times one day.  She denies any fever she denies any difficulty swallowing or shortness or stridor.  Patient reports also that she woke up 3 days ago and was having pain when she presses on her left eye.  She denies any visual changes.  She does note some redness to the eye.  She denies any drainage itching or any pain to the eye, she presses on it.  She does not wear contacts denies any known injury.  She does not work with wood or metal.  She presents here today for further evaluation.    Patient is a 47 y.o. female presenting with eye problem, hematochezia, and sore throat.   History provided by:  Patient   used: No    Eye Problem   Location:  Left eye  Quality: pain when pressing or rubbing eye.  Severity:  Mild  Onset quality:  Sudden  Duration:  3 days  Timing:  Intermittent  Progression:  Unchanged  Chronicity:  New  Context: not burn, not chemical exposure, not contact lens problem, not direct trauma, not foreign body, not using machinery, not scratch, not smoke exposure and not UV exposure    Relieved by:  Nothing  Worsened by:  Nothing  Ineffective treatments:  None tried  Associated symptoms: redness    Associated symptoms: no blurred vision, no crusting, no decreased vision, no discharge, no double vision, no facial rash, no headaches, no inflammation, no itching, no nausea, no numbness, no photophobia, no scotomas, no " swelling, no tearing, no tingling, no vomiting and no weakness    Risk factors: no conjunctival hemorrhage, no exposure to pinkeye, no previous injury to eye, no recent herpes zoster and no recent URI    Rectal Bleeding   Quality:  Maroon  Amount:  Moderate  Duration:  12 days  Timing:  Intermittent  Chronicity:  Recurrent  Context: hemorrhoids    Context: not anal fissures, not anal penetration, not constipation, not defecation, not diarrhea, not foreign body, not rectal injury, not rectal pain and not spontaneously    Similar prior episodes: yes    Relieved by:  Nothing  Worsened by:  Nothing  Ineffective treatments:  None tried  Associated symptoms: no abdominal pain, no dizziness, no epistaxis, no fever, no hematemesis, no light-headedness, no loss of consciousness, no recent illness and no vomiting    Risk factors: no anticoagulant use, no hx of colorectal cancer, no hx of colorectal surgery, no hx of IBD, no liver disease, no NSAID use and no steroid use    Sore Throat   Location:  Generalized  Quality:  Aching and burning  Severity:  Mild  Onset quality:  Sudden  Duration:  1 day  Timing:  Constant  Progression:  Unchanged  Chronicity:  New  Relieved by:  Nothing  Worsened by:  Nothing  Ineffective treatments:  None tried  Associated symptoms: chills    Associated symptoms: no abdominal pain, no adenopathy, no chest pain, no cough, no drooling, no ear discharge, no ear pain, no epistaxis, no eye discharge, no fever, no headaches, no neck stiffness, no night sweats, no plugged ear sensation, no postnasal drip, no rash, no rhinorrhea, no shortness of breath, no sinus congestion, no stridor, no trouble swallowing and no voice change    Risk factors: no exposure to strep, no exposure to mono, no sick contacts, no recent dental procedure, no recent endoscopy and no recent ENT procedure        Review of Systems   Constitutional: Positive for chills. Negative for fever and night sweats.   HENT: Positive for sore  throat. Negative for drooling, ear discharge, ear pain, nosebleeds, postnasal drip, rhinorrhea, trouble swallowing and voice change.    Eyes: Positive for redness. Negative for blurred vision, double vision, photophobia, discharge and itching.   Respiratory: Negative for cough, shortness of breath and stridor.    Cardiovascular: Negative for chest pain.   Gastrointestinal: Positive for hematochezia. Negative for abdominal pain, hematemesis, nausea and vomiting.   Musculoskeletal: Negative for neck stiffness.   Skin: Negative for rash.   Neurological: Negative for dizziness, tingling, loss of consciousness, weakness, light-headedness, numbness and headaches.   Hematological: Negative for adenopathy.   All other systems reviewed and are negative.      Past Medical History:   Diagnosis Date   • Depression    • Vertigo        Allergies   Allergen Reactions   • Penicillins        History reviewed. No pertinent surgical history.    History reviewed. No pertinent family history.    Social History     Social History   • Marital status:      Spouse name: N/A   • Number of children: N/A   • Years of education: N/A     Social History Main Topics   • Smoking status: Never Smoker   • Smokeless tobacco: None   • Alcohol use No   • Drug use: No   • Sexual activity: Not Asked     Other Topics Concern   • None     Social History Narrative               Objective   Physical Exam   Constitutional: She is oriented to person, place, and time. She appears well-developed and well-nourished.   HENT:   Head: Normocephalic and atraumatic.   Right Ear: External ear normal.   Left Ear: External ear normal.   Nose: Nose normal.   Mouth/Throat: Oropharynx is clear and moist.   Eyes: Conjunctivae and lids are normal. Pupils are equal, round, and reactive to light. Lids are everted and swept, no foreign bodies found.   Slit lamp exam:       The left eye shows corneal abrasion.   Cardiovascular: Normal rate, regular rhythm and normal  heart sounds.    Pulmonary/Chest: Effort normal and breath sounds normal.   Abdominal: Soft. Bowel sounds are normal.   Genitourinary: Rectal exam shows external hemorrhoid and guaiac positive stool.   Neurological: She is alert and oriented to person, place, and time.   Skin: Skin is warm and dry.   Psychiatric: She has a normal mood and affect.   Nursing note and vitals reviewed.      Procedures         ED Course  ED Course   Comment By Time   Trace amt of blood noted on hemocult stool; exam chaperoned per MAO Arce. Aurora M Maggy, APRN 11/19 1610   Patient's labs are reviewed.  Patient has a normal hemoglobin and hematocrit. Aurora M Maggy, APRN 11/19 1727   The patient has not been able to provide a stool sample while in the ER today.  The patient's been stable.  Again she denies any abdominal pain nausea or fever.  She has a normal CMP and CBC.  Her hemoglobin and hematocrit are stable.  CT scan of the abdomen and pelvis showed no acute findings. Aurora Winter, APRN 11/19 1737   The patient does have a small corneal abrasion noted on the exam.  I will give her prescription for antibiotic to treat this.  Patient's strep screen was negative. Aurora FRIED Maggy, APRN 11/19 1739   I did offer the patient admission to the hospital for her complaint of rectal bleeding.  She declines at this time.  Patient states she feels more comfortable going home and following up with Dr. Mora as an outpatient.  Advised the patient that I do recommend she call him tomorrow to schedule an appointment.  Have advised her to please return to the ER if any new or worsening symptoms. I will give the pt some anusol HC and Flagyl.  Patient will be discharged home at this time in stable condition. Aurora M Maggy, APRN 11/19 1740      CT Abdomen Pelvis With Contrast   Final Result   1. No evidence of acute abdominopelvic process.            This report was finalized on 11/19/2017 17:09 by Dr. Kaiden Marcus MD.        Labs  Reviewed   URINALYSIS W/ CULTURE IF INDICATED - Abnormal; Notable for the following:        Result Value    Leuk Esterase, UA Trace (*)     All other components within normal limits   LACTIC ACID, PLASMA - Abnormal; Notable for the following:     Lactate 2.1 (*)     All other components within normal limits   C-REACTIVE PROTEIN - Abnormal; Notable for the following:     C-Reactive Protein 1.30 (*)     All other components within normal limits   CBC WITH AUTO DIFFERENTIAL - Abnormal; Notable for the following:     MPV 12.3 (*)     All other components within normal limits   URINALYSIS, MICROSCOPIC ONLY - Abnormal; Notable for the following:     RBC, UA 0-2 (*)     WBC, UA 0-2 (*)     All other components within normal limits   POCT OCCULT BLOOD STOOL - Abnormal; Notable for the following:     Fecal Occult Blood Positive (*)     All other components within normal limits   INFLUENZA ANTIGEN, RAPID - Normal    Narrative:     Recommend confirmation of negative results by viral culture or molecular assay.   RAPID STREP A SCREEN - Normal   PROTIME-INR - Normal   APTT - Normal   LIPASE - Normal   AMYLASE - Normal   POCT PEFORM URINE PREGNANCY - Normal   BETA HEMOLYTIC STREP CULTURE, THROAT   GASTROINTESTINAL PANEL, PCR   COMPREHENSIVE METABOLIC PANEL   LACTIC ACID REFLEX TIMER   TYPE AND SCREEN   CBC AND DIFFERENTIAL    Narrative:     The following orders were created for panel order CBC & Differential.  Procedure                               Abnormality         Status                     ---------                               -----------         ------                     CBC Auto Differential[[id number]]        Abnormal            Final result                 Please view results for these tests on the individual orders.                 MDM  Number of Diagnoses or Management Options  Abrasion of left cornea, initial encounter: new and requires workup  Hemorrhoids, unspecified hemorrhoid type: established and  worsening  Rectal bleeding: new and requires workup     Amount and/or Complexity of Data Reviewed  Clinical lab tests: ordered and reviewed  Tests in the radiology section of CPT®: reviewed and ordered  Discuss the patient with other providers: yes    Patient Progress  Patient progress: stable      Final diagnoses:   Rectal bleeding   Hemorrhoids, unspecified hemorrhoid type   Abrasion of left cornea, initial encounter            Aurora Winter, APRN  11/19/17 [id number]

## 2017-11-21 LAB — BACTERIA SPEC AEROBE CULT: NORMAL

## 2017-11-27 NOTE — ED NOTES
"ED Call Back Questions    1. How are you doing since leaving the Emergency Department?  better    2. Do you have any questions about your discharge instructions? No     3. Have you filled your new prescriptions yet? Yes   a. Do you have any questions about those medications? No     4. Were you able to make a follow-up appointment with the physician? Yes     5. Do you have a primary care physician? Yes   a. If No, would you like for me to set you up with one? N/A  i. If Yes, “I will have our ED  give you a call right back at this number to work with you on the best time for an appointment.”    6. We are always looking to get better at what we do. Do you have any suggestions for what we can do to be even better? No   a. If Yes, \"Thank you for sharing your concerns. I apologize. I will follow up with our manager and patient . Would you like someone to call you back?\" N/A    7. Is there anything else I can do for you? No        Mary Pete RN  11/27/17 1524    "

## 2018-01-11 ENCOUNTER — OFFICE VISIT (OUTPATIENT)
Dept: FAMILY MEDICINE CLINIC | Facility: CLINIC | Age: 48
End: 2018-01-11

## 2018-01-11 VITALS
RESPIRATION RATE: 16 BRPM | HEART RATE: 62 BPM | HEIGHT: 63 IN | DIASTOLIC BLOOD PRESSURE: 80 MMHG | SYSTOLIC BLOOD PRESSURE: 122 MMHG | BODY MASS INDEX: 26.93 KG/M2 | WEIGHT: 152 LBS | OXYGEN SATURATION: 98 %

## 2018-01-11 DIAGNOSIS — J32.9 SINUSITIS, UNSPECIFIED CHRONICITY, UNSPECIFIED LOCATION: Primary | ICD-10-CM

## 2018-01-11 PROCEDURE — 99213 OFFICE O/P EST LOW 20 MIN: CPT | Performed by: FAMILY MEDICINE

## 2018-01-11 RX ORDER — LEVOFLOXACIN 500 MG/1
500 TABLET, FILM COATED ORAL DAILY
Qty: 10 TABLET | Refills: 0 | Status: SHIPPED | OUTPATIENT
Start: 2018-01-11 | End: 2018-08-21

## 2018-01-11 RX ORDER — FLUTICASONE PROPIONATE 50 MCG
2 SPRAY, SUSPENSION (ML) NASAL DAILY
Qty: 1 BOTTLE | Refills: 0 | Status: SHIPPED | OUTPATIENT
Start: 2018-01-11 | End: 2019-02-11

## 2018-01-11 NOTE — PROGRESS NOTES
"Subjective   Haley Jewell is a 47 y.o. female.     Chief Complaint   Patient presents with   • Follow-up     6 mo   • Sinus Problem     pt c/o sinus pressure around eyes and forehead & HA, also c/o no appetite & nausea   x 4 days        History of Present Illness     notes haivng sinus pain and pressure over 4 days      Current Outpatient Prescriptions:   •  diazePAM (VALIUM) 5 MG tablet, Take 1 tablet by mouth Every 6 (Six) Hours As Needed for Anxiety., Disp: 15 tablet, Rfl: 0  •  fluticasone (FLONASE) 50 MCG/ACT nasal spray, 2 sprays into each nostril Daily., Disp: 1 bottle, Rfl: 0  •  levoFLOXacin (LEVAQUIN) 500 MG tablet, Take 1 tablet by mouth Daily., Disp: 10 tablet, Rfl: 0  •  meclizine (ANTIVERT) 25 MG tablet, Take 1 tablet by mouth 3 (Three) Times a Day As Needed for dizziness., Disp: 90 tablet, Rfl: 0  •  sertraline (ZOLOFT) 25 MG tablet, Take 1 tablet by mouth Daily., Disp: 30 tablet, Rfl: 5  Allergies   Allergen Reactions   • Penicillins        Past Medical History:   Diagnosis Date   • Depression    • Vertigo      No past surgical history on file.    Review of Systems   Constitutional: Positive for chills.   HENT: Positive for congestion, rhinorrhea, sinus pain, sinus pressure and sore throat.    Eyes: Negative.    Respiratory: Negative.    Cardiovascular: Negative.    Gastrointestinal: Negative.    Endocrine: Negative.    Genitourinary: Negative.    Musculoskeletal: Negative.    Skin: Negative.    Allergic/Immunologic: Negative.    Neurological: Negative.    Hematological: Negative.    Psychiatric/Behavioral: Negative.        Objective  /80  Pulse 62  Resp 16  Ht 160 cm (63\")  Wt 68.9 kg (152 lb)  SpO2 98%  BMI 26.93 kg/m2  Physical Exam   Constitutional: She appears well-developed and well-nourished.   HENT:   Head: Normocephalic and atraumatic.   Right Ear: External ear normal.   Left Ear: External ear normal.   Nose: Nose normal.   Mouth/Throat: Oropharynx is clear and moist.   Notes " sinus tenderness over max sinus and post nasal drips   Eyes: Conjunctivae and EOM are normal. Pupils are equal, round, and reactive to light.   Neck: Normal range of motion. Neck supple.   Cardiovascular: Normal rate, regular rhythm, normal heart sounds and intact distal pulses.    Pulmonary/Chest: Effort normal and breath sounds normal.   Abdominal: Soft. Bowel sounds are normal.   Musculoskeletal: Normal range of motion.   Neurological: She is alert. She has normal reflexes.   Skin: Skin is warm.   Psychiatric: She has a normal mood and affect. Her behavior is normal. Judgment and thought content normal.   Nursing note and vitals reviewed.      Assessment/Plan   Haley was seen today for follow-up and sinus problem.    Diagnoses and all orders for this visit:    Sinusitis, unspecified chronicity, unspecified location    Other orders  -     levoFLOXacin (LEVAQUIN) 500 MG tablet; Take 1 tablet by mouth Daily.  -     fluticasone (FLONASE) 50 MCG/ACT nasal spray; 2 sprays into each nostril Daily.                 No orders of the defined types were placed in this encounter.      Follow up: 4 month(s)

## 2018-02-10 ENCOUNTER — HOSPITAL ENCOUNTER (EMERGENCY)
Facility: HOSPITAL | Age: 48
Discharge: HOME | End: 2018-02-10
Admitting: EMERGENCY MEDICINE

## 2018-02-10 VITALS
WEIGHT: 158 LBS | RESPIRATION RATE: 18 BRPM | HEART RATE: 91 BPM | SYSTOLIC BLOOD PRESSURE: 144 MMHG | TEMPERATURE: 98.3 F | OXYGEN SATURATION: 100 % | HEIGHT: 60 IN | BODY MASS INDEX: 31.02 KG/M2 | DIASTOLIC BLOOD PRESSURE: 89 MMHG

## 2018-02-10 DIAGNOSIS — J02.9 PHARYNGITIS, UNSPECIFIED ETIOLOGY: Primary | ICD-10-CM

## 2018-02-10 LAB
FLUAV AG NPH QL: NEGATIVE
FLUBV AG NPH QL IA: NEGATIVE
S PYO AG THROAT QL: NEGATIVE

## 2018-02-10 PROCEDURE — 87880 STREP A ASSAY W/OPTIC: CPT | Performed by: NURSE PRACTITIONER

## 2018-02-10 PROCEDURE — 87804 INFLUENZA ASSAY W/OPTIC: CPT | Performed by: NURSE PRACTITIONER

## 2018-02-10 PROCEDURE — 87081 CULTURE SCREEN ONLY: CPT | Performed by: NURSE PRACTITIONER

## 2018-02-10 PROCEDURE — 99283 EMERGENCY DEPT VISIT LOW MDM: CPT

## 2018-02-10 RX ORDER — CEFUROXIME AXETIL 500 MG/1
500 TABLET ORAL 2 TIMES DAILY
Qty: 20 TABLET | Refills: 0 | Status: SHIPPED | OUTPATIENT
Start: 2018-02-10 | End: 2018-02-20

## 2018-02-10 RX ORDER — METHYLPREDNISOLONE 4 MG/1
TABLET ORAL
Qty: 1 EACH | Refills: 0 | Status: SHIPPED | OUTPATIENT
Start: 2018-02-10 | End: 2018-08-21

## 2018-02-11 NOTE — ED NOTES
Patient discharged home  with family at side ambulatory to personal car. No distress noted. Personal belongings with patient. Patient voiced understanding to instructions given.        Yazmin Lozoya RN  02/10/18 1936

## 2018-02-11 NOTE — ED PROVIDER NOTES
Subjective   Patient is a 47 y.o. female presenting with sore throat.   History provided by:  Patient   used: No    Sore Throat   Location:  Posterior  Quality:  Sharp and sore  Severity:  Mild  Duration:  1 day  Progression:  Worsening  Chronicity:  New  Relieved by:  None tried  Worsened by:  Nothing  Ineffective treatments:  None tried  Associated symptoms: chills, cough, ear pain, fever, headaches and rhinorrhea    Associated symptoms: no abdominal pain, no adenopathy, no chest pain and no ear discharge    Risk factors: sick contacts      48 y/o female presents to the ER with c/o cough, fever, sore throat,  And body aches. Patient states that she has been exposed to the flu. She states that it started yesterday afternoon.   Review of Systems   Constitutional: Positive for chills and fever.   HENT: Positive for ear pain, rhinorrhea and sore throat. Negative for ear discharge.    Respiratory: Positive for cough.    Cardiovascular: Negative for chest pain.   Gastrointestinal: Negative for abdominal pain.   Neurological: Positive for headaches.   Hematological: Negative for adenopathy.   All other systems reviewed and are negative.      Past Medical History:   Diagnosis Date   • Depression    • Vertigo        Allergies   Allergen Reactions   • Penicillins Anxiety       History reviewed. No pertinent surgical history.    History reviewed. No pertinent family history.    Social History     Social History   • Marital status:      Spouse name: N/A   • Number of children: N/A   • Years of education: N/A     Social History Main Topics   • Smoking status: Never Smoker   • Smokeless tobacco: None   • Alcohol use No   • Drug use: No   • Sexual activity: Not Asked     Other Topics Concern   • None     Social History Narrative               Objective   Physical Exam   Constitutional: She is oriented to person, place, and time. She appears well-developed and well-nourished. No distress.   HENT:  "  Head: Normocephalic and atraumatic.   Right Ear: Tympanic membrane is erythematous.   Left Ear: Tympanic membrane is erythematous.   Mouth/Throat: Posterior oropharyngeal edema and posterior oropharyngeal erythema present. Tonsils are 2+ on the right. Tonsils are 2+ on the left. Tonsillar exudate.   Eyes: EOM are normal. Pupils are equal, round, and reactive to light. Right eye exhibits no discharge. Left eye exhibits no discharge.   Neck: Normal range of motion. Neck supple. No JVD present. No tracheal deviation present. No thyromegaly present.   Cardiovascular: Normal rate and regular rhythm.    Pulmonary/Chest: Effort normal and breath sounds normal. No respiratory distress. She has no wheezes.   Musculoskeletal: Normal range of motion. She exhibits no edema, tenderness or deformity.   Neurological: She is alert and oriented to person, place, and time. She displays normal reflexes. No cranial nerve deficit. Coordination normal.   Skin: Skin is warm and dry. No rash noted. She is not diaphoretic. No erythema. No pallor.   Psychiatric: She has a normal mood and affect. Her behavior is normal. Thought content normal.   Nursing note and vitals reviewed.      Procedures         ED Course  ED Course   Comment By Time   Results reviewed with patient. She is flu and strep negative. Will treat patient as acute pharyngitis and discharge home in stable condition. Return to ER with worsening symptoms. Patient verbalizes understanding. Angela Farias, APRN 02/10 1919        Labs Reviewed   INFLUENZA ANTIGEN, RAPID - Normal    Narrative:     Recommend confirmation of negative results by viral culture or molecular assay.   RAPID STREP A SCREEN - Normal   BETA HEMOLYTIC STREP CULTURE, THROAT     No orders to display     Vitals:    02/10/18 1806   BP: 145/96   Pulse: 101   Resp: 19   Temp: 98 °F (36.7 °C)   TempSrc: Temporal Artery    SpO2: 99%   Weight: 71.7 kg (158 lb)   Height: 152.4 cm (60\")             MDM  Number of " Diagnoses or Management Options  Pharyngitis, unspecified etiology: minor     Amount and/or Complexity of Data Reviewed  Clinical lab tests: reviewed  Discussion of test results with the performing providers: yes  Discuss the patient with other providers: yes    Risk of Complications, Morbidity, and/or Mortality  Presenting problems: minimal  Diagnostic procedures: minimal  Management options: minimal    Patient Progress  Patient progress: stable      Final diagnoses:   Pharyngitis, unspecified etiology            Angela Farias, VALERIA  02/10/18 1921

## 2018-02-12 LAB — BACTERIA SPEC AEROBE CULT: NORMAL

## 2018-03-23 ENCOUNTER — TELEPHONE (OUTPATIENT)
Dept: FAMILY MEDICINE CLINIC | Facility: CLINIC | Age: 48
End: 2018-03-23

## 2018-03-23 RX ORDER — SERTRALINE HYDROCHLORIDE 25 MG/1
25 TABLET, FILM COATED ORAL DAILY
Qty: 30 TABLET | Refills: 5 | Status: SHIPPED | OUTPATIENT
Start: 2018-03-23 | End: 2018-10-05 | Stop reason: SDUPTHER

## 2018-06-08 ENCOUNTER — HOSPITAL ENCOUNTER (EMERGENCY)
Facility: HOSPITAL | Age: 48
Discharge: HOME | End: 2018-06-09
Admitting: EMERGENCY MEDICINE

## 2018-06-08 ENCOUNTER — TELEPHONE (OUTPATIENT)
Dept: FAMILY MEDICINE CLINIC | Facility: CLINIC | Age: 48
End: 2018-06-08

## 2018-06-08 DIAGNOSIS — K64.5 THROMBOSED EXTERNAL HEMORRHOID: Primary | ICD-10-CM

## 2018-06-08 PROCEDURE — 99283 EMERGENCY DEPT VISIT LOW MDM: CPT

## 2018-06-08 NOTE — TELEPHONE ENCOUNTER
called and states pt has Hemorid and has been using OTC supp and they are not helping and would like to have something called in [phone]

## 2018-06-09 VITALS
DIASTOLIC BLOOD PRESSURE: 72 MMHG | HEART RATE: 64 BPM | OXYGEN SATURATION: 98 % | BODY MASS INDEX: 24.32 KG/M2 | WEIGHT: 146 LBS | TEMPERATURE: 97.9 F | RESPIRATION RATE: 16 BRPM | HEIGHT: 65 IN | SYSTOLIC BLOOD PRESSURE: 138 MMHG

## 2018-06-09 LAB
APTT PPP: 30.5 SECONDS (ref 24.1–34.8)
BASOPHILS # BLD AUTO: 0.04 10*3/MM3 (ref 0–0.2)
BASOPHILS NFR BLD AUTO: 0.6 % (ref 0–2)
DEPRECATED RDW RBC AUTO: 38.6 FL (ref 40–54)
EOSINOPHIL # BLD AUTO: 0.11 10*3/MM3 (ref 0–0.7)
EOSINOPHIL NFR BLD AUTO: 1.6 % (ref 0–4)
ERYTHROCYTE [DISTWIDTH] IN BLOOD BY AUTOMATED COUNT: 12.8 % (ref 12–15)
HCT VFR BLD AUTO: 40.1 % (ref 37–47)
HGB BLD-MCNC: 13.3 G/DL (ref 12–16)
IMM GRANULOCYTES # BLD: 0.01 10*3/MM3 (ref 0–0.03)
IMM GRANULOCYTES NFR BLD: 0.1 % (ref 0–5)
INR PPP: 0.97 (ref 0.91–1.09)
LYMPHOCYTES # BLD AUTO: 2.46 10*3/MM3 (ref 0.72–4.86)
LYMPHOCYTES NFR BLD AUTO: 36.1 % (ref 15–45)
MCH RBC QN AUTO: 27.3 PG (ref 28–32)
MCHC RBC AUTO-ENTMCNC: 33.2 G/DL (ref 33–36)
MCV RBC AUTO: 82.3 FL (ref 82–98)
MONOCYTES # BLD AUTO: 0.68 10*3/MM3 (ref 0.19–1.3)
MONOCYTES NFR BLD AUTO: 10 % (ref 4–12)
NEUTROPHILS # BLD AUTO: 3.51 10*3/MM3 (ref 1.87–8.4)
NEUTROPHILS NFR BLD AUTO: 51.6 % (ref 39–78)
NRBC BLD MANUAL-RTO: 0 /100 WBC (ref 0–0)
PLATELET # BLD AUTO: 243 10*3/MM3 (ref 130–400)
PMV BLD AUTO: 12.1 FL (ref 6–12)
PROTHROMBIN TIME: 13.2 SECONDS (ref 11.9–14.6)
RBC # BLD AUTO: 4.87 10*6/MM3 (ref 4.2–5.4)
WBC NRBC COR # BLD: 6.81 10*3/MM3 (ref 4.8–10.8)

## 2018-06-09 PROCEDURE — 85610 PROTHROMBIN TIME: CPT | Performed by: PHYSICIAN ASSISTANT

## 2018-06-09 PROCEDURE — 36415 COLL VENOUS BLD VENIPUNCTURE: CPT

## 2018-06-09 PROCEDURE — 85730 THROMBOPLASTIN TIME PARTIAL: CPT | Performed by: PHYSICIAN ASSISTANT

## 2018-06-09 PROCEDURE — 85025 COMPLETE CBC W/AUTO DIFF WBC: CPT | Performed by: PHYSICIAN ASSISTANT

## 2018-06-09 RX ORDER — KETOROLAC TROMETHAMINE 10 MG/1
10 TABLET, FILM COATED ORAL EVERY 6 HOURS PRN
Qty: 18 TABLET | Refills: 0 | Status: SHIPPED | OUTPATIENT
Start: 2018-06-09 | End: 2018-08-21

## 2018-06-09 RX ORDER — SULFAMETHOXAZOLE AND TRIMETHOPRIM 800; 160 MG/1; MG/1
1 TABLET ORAL 2 TIMES DAILY
Qty: 20 TABLET | Refills: 0 | Status: SHIPPED | OUTPATIENT
Start: 2018-06-09 | End: 2018-08-21

## 2018-06-09 RX ORDER — LIDOCAINE HYDROCHLORIDE AND EPINEPHRINE BITARTRATE 20; .01 MG/ML; MG/ML
10 INJECTION, SOLUTION SUBCUTANEOUS ONCE
Status: COMPLETED | OUTPATIENT
Start: 2018-06-09 | End: 2018-06-09

## 2018-06-09 RX ADMIN — LIDOCAINE HYDROCHLORIDE,EPINEPHRINE BITARTRATE 10 ML: 20; .01 INJECTION, SOLUTION INFILTRATION; PERINEURAL at 01:59

## 2018-06-09 RX ADMIN — Medication 3 ML: at 01:42

## 2018-06-09 NOTE — ED PROVIDER NOTES
Subjective   47-year-old female presents with a chief complaint of hemorrhoid pain.  The patient reports she has had worsening hemorrhoids over the past 1 week.  She has had hemorrhoids multiple times in the past.  She has been treated with suppositories and hydrocortisone cream and it is not helping.  Patient reports significant distress and pain.  The patient reports bowel movements do not worsen her pain but she cannot tolerate sitting due to the severity of her symptoms.  Denies nausea vomiting diarrhea fevers chills chest pain, shortness of breath.  No rectal bleeding            Review of Systems   All other systems reviewed and are negative.      Past Medical History:   Diagnosis Date   • Depression    • Vertigo        Allergies   Allergen Reactions   • Penicillins Anxiety       History reviewed. No pertinent surgical history.    History reviewed. No pertinent family history.    Social History     Social History   • Marital status:      Social History Main Topics   • Smoking status: Never Smoker   • Alcohol use No   • Drug use: No     Other Topics Concern   • Not on file     Social History Narrative               Objective   Physical Exam   Constitutional: She appears well-developed and well-nourished.   HENT:   Head: Normocephalic and atraumatic.   Eyes: EOM are normal. Pupils are equal, round, and reactive to light.   Neck: Normal range of motion. Neck supple.   Cardiovascular: Normal rate and regular rhythm.    Pulmonary/Chest: Effort normal and breath sounds normal.   Abdominal: Soft. Bowel sounds are normal. She exhibits no distension. There is no tenderness.   Genitourinary:   Genitourinary Comments: Multiple hemorrhoids and skin tags, large thrombosed hemorrhoid 3 cm   Musculoskeletal: Normal range of motion.   Lymphadenopathy:     She has no cervical adenopathy.   Neurological: She is alert.   Skin: Skin is warm and dry.   Psychiatric: She has a normal mood and affect. Her behavior is  normal.   Nursing note and vitals reviewed.      Procedures           ED Course                  MDM  Number of Diagnoses or Management Options  Diagnosis management comments: Clot excavation via incision performed by Dr. Haley, will DC with general surgery referral and proctofoam       Amount and/or Complexity of Data Reviewed  Clinical lab tests: reviewed and ordered  Tests in the medicine section of CPT®: reviewed and ordered    Risk of Complications, Morbidity, and/or Mortality  Presenting problems: moderate  Diagnostic procedures: moderate  Management options: moderate    Patient Progress  Patient progress: stable        Final diagnoses:   Thrombosed external hemorrhoid            Cesar Garrett PA-C  06/09/18 1419       Cesar Garrett PA-C  06/09/18 1420

## 2018-06-09 NOTE — ED PROVIDER NOTES
Subjective   See GERSON Garrett's note for HPI this is just the procedure note!             Review of Systems    Past Medical History:   Diagnosis Date   • Depression    • Vertigo        Allergies   Allergen Reactions   • Penicillins Anxiety       History reviewed. No pertinent surgical history.    History reviewed. No pertinent family history.    Social History     Social History   • Marital status:      Social History Main Topics   • Smoking status: Never Smoker   • Alcohol use No   • Drug use: No     Other Topics Concern   • Not on file     Social History Narrative               Objective   Physical Exam    Incision & Drainage  Date/Time: 6/9/2018 2:20 AM  Performed by: GARRETT ABEL  Authorized by: GARRETT ABEL     Consent:     Consent obtained:  Written    Consent given by:  Patient    Risks discussed:  Bleeding, incomplete drainage, infection, damage to other organs and pain    Alternatives discussed:  No treatment and referral  Location:     Type:  External thrombosed hemorrhoid    Location: hemorrhoid.  Pre-procedure details:     Skin preparation:  Chloraprep  Anesthesia (see MAR for exact dosages):     Anesthesia method:  Local infiltration    Local anesthetic:  Lidocaine 2% WITH epi  Procedure type:     Complexity:  Simple  Procedure details:     Needle aspiration: no      Incision types:  Elliptical    Incision depth:  Dermal    Scalpel blade:  11    Wound management:  Probed and deloculated (removed thrombus)    Wound treatment:  Wound left open    Packing materials:  None  Post-procedure details:     Patient tolerance of procedure:  Tolerated well, no immediate complications               ED Course                  MDM      Final diagnoses:   None            Garrett Abel MD  06/09/18 0221

## 2018-08-21 ENCOUNTER — OFFICE VISIT (OUTPATIENT)
Dept: FAMILY MEDICINE CLINIC | Facility: CLINIC | Age: 48
End: 2018-08-21

## 2018-08-21 VITALS
DIASTOLIC BLOOD PRESSURE: 80 MMHG | HEART RATE: 61 BPM | BODY MASS INDEX: 24.32 KG/M2 | WEIGHT: 146 LBS | RESPIRATION RATE: 16 BRPM | SYSTOLIC BLOOD PRESSURE: 122 MMHG | HEIGHT: 65 IN | OXYGEN SATURATION: 98 %

## 2018-08-21 DIAGNOSIS — Z00.00 WELLNESS EXAMINATION: Primary | ICD-10-CM

## 2018-08-21 PROCEDURE — 99396 PREV VISIT EST AGE 40-64: CPT | Performed by: FAMILY MEDICINE

## 2018-08-21 NOTE — PROGRESS NOTES
"Subjective   Haley Jewell is a 47 y.o. female.     Chief Complaint   Patient presents with   • Annual Exam     check up       History of Present Illness     here today with her  yoly---se[facility]yn [facility]      Current Outpatient Prescriptions:   •  fluticasone (FLONASE) 50 MCG/ACT nasal spray, 2 sprays into each nostril Daily., Disp: 1 bottle, Rfl: 0  •  sertraline (ZOLOFT) 25 MG tablet, Take 1 tablet by mouth Daily., Disp: 30 tablet, Rfl: 5  Allergies   Allergen Reactions   • Penicillins Anxiety       Past Medical History:   Diagnosis Date   • Depression    • Vertigo      No past surgical history on file.    Review of Systems   Constitutional: Negative.    HENT: Negative.    Eyes: Negative.    Respiratory: Negative.    Cardiovascular: Negative.    Gastrointestinal: Negative.    Endocrine: Negative.    Genitourinary: Negative.    Musculoskeletal: Negative.    Skin: Negative.    Allergic/Immunologic: Negative.    Neurological: Negative.    Hematological: Negative.    Psychiatric/Behavioral: Negative.        Objective  /80   Pulse 61   Resp 16   Ht 165.1 cm (65\")   Wt 66.2 kg (146 lb)   SpO2 98%   BMI 24.30 kg/m²   Physical Exam   Constitutional: She is oriented to person, place, and time. She appears well-developed and well-nourished.   HENT:   Head: Normocephalic and atraumatic.   Right Ear: External ear normal.   Left Ear: External ear normal.   Nose: Nose normal.   Mouth/Throat: Oropharynx is clear and moist.   Eyes: Pupils are equal, round, and reactive to light. Conjunctivae and EOM are normal.   Neck: Normal range of motion. Neck supple.   Cardiovascular: Normal rate, regular rhythm, normal heart sounds and intact distal pulses.    Pulmonary/Chest: Effort normal and breath sounds normal.   Abdominal: Soft. Bowel sounds are normal.   Musculoskeletal: Normal range of motion.   Neurological: She is alert and oriented to person, place, and time.   Skin: Skin is warm. Capillary refill " takes less than 2 seconds.   Psychiatric: She has a normal mood and affect. Her behavior is normal. Judgment and thought content normal.   Vitals reviewed.      Assessment/Plan   Haley was seen today for annual exam.    Diagnoses and all orders for this visit:    Wellness examination  -     CBC w AUTO Differential  -     Comprehensive metabolic panel  -     Lipid Panel w/ Chol/HDL Ratio  -     Vitamin D 25 hydroxy                  Orders Placed This Encounter   Procedures   • Comprehensive metabolic panel   • Lipid Panel w/ Chol/HDL Ratio   • Vitamin D 25 hydroxy   • CBC w AUTO Differential     Order Specific Question:   Manual Differential     Answer:   No       Follow up: 6 month(s)

## 2018-08-22 LAB
25(OH)D3+25(OH)D2 SERPL-MCNC: 23 NG/ML (ref 30–100)
ALBUMIN SERPL-MCNC: 4.2 G/DL (ref 3.5–5)
ALBUMIN/GLOB SERPL: 1.3 G/DL (ref 1.1–2.5)
ALP SERPL-CCNC: 102 U/L (ref 24–120)
ALT SERPL-CCNC: 36 U/L (ref 0–54)
AST SERPL-CCNC: 28 U/L (ref 7–45)
BASOPHILS # BLD AUTO: 0.07 10*3/MM3 (ref 0–0.2)
BASOPHILS NFR BLD AUTO: 0.8 % (ref 0–2)
BILIRUB SERPL-MCNC: 0.3 MG/DL (ref 0.1–1)
BUN SERPL-MCNC: 11 MG/DL (ref 5–21)
BUN/CREAT SERPL: 22.4 (ref 7–25)
CALCIUM SERPL-MCNC: 9.3 MG/DL (ref 8.4–10.4)
CHLORIDE SERPL-SCNC: 103 MMOL/L (ref 98–110)
CHOLEST SERPL-MCNC: 160 MG/DL (ref 130–200)
CHOLEST/HDLC SERPL: 5.16 {RATIO}
CO2 SERPL-SCNC: 27 MMOL/L (ref 24–31)
CREAT SERPL-MCNC: 0.49 MG/DL (ref 0.5–1.4)
EOSINOPHIL # BLD AUTO: 0.14 10*3/MM3 (ref 0–0.7)
EOSINOPHIL NFR BLD AUTO: 1.7 % (ref 0–4)
ERYTHROCYTE [DISTWIDTH] IN BLOOD BY AUTOMATED COUNT: 13.8 % (ref 12–15)
GLOBULIN SER CALC-MCNC: 3.3 GM/DL
GLUCOSE SERPL-MCNC: 73 MG/DL (ref 70–100)
HCT VFR BLD AUTO: 41.7 % (ref 37–47)
HDLC SERPL-MCNC: 31 MG/DL
HGB BLD-MCNC: 13 G/DL (ref 12–16)
IMM GRANULOCYTES # BLD: 0.03 10*3/MM3 (ref 0–0.03)
IMM GRANULOCYTES NFR BLD: 0.4 % (ref 0–5)
LDLC SERPL CALC-MCNC: 74 MG/DL (ref 0–99)
LYMPHOCYTES # BLD AUTO: 2.95 10*3/MM3 (ref 0.72–4.86)
LYMPHOCYTES NFR BLD AUTO: 35.1 % (ref 15–45)
MCH RBC QN AUTO: 27 PG (ref 28–32)
MCHC RBC AUTO-ENTMCNC: 31.2 G/DL (ref 33–36)
MCV RBC AUTO: 86.5 FL (ref 82–98)
MONOCYTES # BLD AUTO: 0.65 10*3/MM3 (ref 0.19–1.3)
MONOCYTES NFR BLD AUTO: 7.7 % (ref 4–12)
NEUTROPHILS # BLD AUTO: 4.57 10*3/MM3 (ref 1.87–8.4)
NEUTROPHILS NFR BLD AUTO: 54.3 % (ref 39–78)
NRBC BLD AUTO-RTO: 0 /100 WBC (ref 0–0)
PLATELET # BLD AUTO: 233 10*3/MM3 (ref 130–400)
POTASSIUM SERPL-SCNC: 4.2 MMOL/L (ref 3.5–5.3)
PROT SERPL-MCNC: 7.5 G/DL (ref 6.3–8.7)
RBC # BLD AUTO: 4.82 10*6/MM3 (ref 4.2–5.4)
SODIUM SERPL-SCNC: 140 MMOL/L (ref 135–145)
TRIGL SERPL-MCNC: 276 MG/DL (ref 0–149)
VLDLC SERPL CALC-MCNC: 55.2 MG/DL
WBC # BLD AUTO: 8.41 10*3/MM3 (ref 4.8–10.8)

## 2018-08-23 RX ORDER — ERGOCALCIFEROL 1.25 MG/1
50000 CAPSULE ORAL WEEKLY
Qty: 12 CAPSULE | Refills: 0 | Status: SHIPPED | OUTPATIENT
Start: 2018-08-23 | End: 2019-02-11

## 2018-08-23 NOTE — PROGRESS NOTES
Pt informed all labs are ok, but vit d is low. Needs to take Vit D 50,000 IU weekly and giulia Vit D in 3 mos. Rx sent to [facility]

## 2018-10-05 RX ORDER — SERTRALINE HYDROCHLORIDE 25 MG/1
TABLET, FILM COATED ORAL
Qty: 30 TABLET | Refills: 5 | Status: SHIPPED | OUTPATIENT
Start: 2018-10-05 | End: 2019-07-29 | Stop reason: SDUPTHER

## 2018-11-30 ENCOUNTER — TELEPHONE (OUTPATIENT)
Dept: FAMILY MEDICINE CLINIC | Facility: CLINIC | Age: 48
End: 2018-11-30

## 2018-11-30 DIAGNOSIS — H53.9 VISION CHANGES: Primary | ICD-10-CM

## 2019-02-04 ENCOUNTER — CLINICAL SUPPORT (OUTPATIENT)
Dept: FAMILY MEDICINE CLINIC | Facility: CLINIC | Age: 49
End: 2019-02-04

## 2019-02-04 ENCOUNTER — TELEPHONE (OUTPATIENT)
Dept: FAMILY MEDICINE CLINIC | Facility: CLINIC | Age: 49
End: 2019-02-04

## 2019-02-04 DIAGNOSIS — J02.9 SORE THROAT: Primary | ICD-10-CM

## 2019-02-04 PROCEDURE — 87804 INFLUENZA ASSAY W/OPTIC: CPT | Performed by: FAMILY MEDICINE

## 2019-02-04 PROCEDURE — 87880 STREP A ASSAY W/OPTIC: CPT | Performed by: FAMILY MEDICINE

## 2019-02-04 RX ORDER — AZITHROMYCIN 250 MG/1
TABLET, FILM COATED ORAL
Qty: 6 TABLET | Refills: 0 | Status: SHIPPED | OUTPATIENT
Start: 2019-02-04 | End: 2019-02-11

## 2019-02-04 RX ORDER — CIPROFLOXACIN HYDROCHLORIDE 3.5 MG/ML
1 SOLUTION/ DROPS TOPICAL 3 TIMES DAILY
Qty: 5 ML | Refills: 0 | Status: SHIPPED | OUTPATIENT
Start: 2019-02-04 | End: 2019-02-11

## 2019-02-04 NOTE — TELEPHONE ENCOUNTER
Has pain inside her eyes. Sore throat, headache from the pain in her eyes. She has a flu and strept test per Dr. Mora. Flu was neg for A and B. Strept was neg.    Per  ZPack and Ciloxa eye drops 1 drop each eye TID Can use warm compresses to the eyes. Pt informed of this . Rxs sent to [facility]    Pt informed of all this

## 2019-02-05 LAB
EXPIRATION DATE: NORMAL
EXPIRATION DATE: NORMAL
FLUAV AG NPH QL: NEGATIVE
FLUBV AG NPH QL: NEGATIVE
INTERNAL CONTROL: NORMAL
INTERNAL CONTROL: NORMAL
Lab: NORMAL
Lab: NORMAL
S PYO AG THROAT QL: NEGATIVE

## 2019-02-11 ENCOUNTER — OFFICE VISIT (OUTPATIENT)
Dept: FAMILY MEDICINE CLINIC | Facility: CLINIC | Age: 49
End: 2019-02-11

## 2019-02-11 VITALS
SYSTOLIC BLOOD PRESSURE: 120 MMHG | DIASTOLIC BLOOD PRESSURE: 84 MMHG | BODY MASS INDEX: 24.99 KG/M2 | RESPIRATION RATE: 16 BRPM | WEIGHT: 150 LBS | HEIGHT: 65 IN | OXYGEN SATURATION: 98 % | HEART RATE: 66 BPM

## 2019-02-11 DIAGNOSIS — E55.9 VITAMIN D DEFICIENCY: Primary | ICD-10-CM

## 2019-02-11 PROCEDURE — 99213 OFFICE O/P EST LOW 20 MIN: CPT | Performed by: FAMILY MEDICINE

## 2019-02-11 NOTE — PROGRESS NOTES
"Subjective   Haley Jewell is a 48 y.o. female.     Chief Complaint   Patient presents with   • Follow-up     giulia vit D        History of Present Illness     she was found to have low vit d and has been recciving it for 3mos      Current Outpatient Medications:   •  sertraline (ZOLOFT) 25 MG tablet, TAKE ONE TABLET DAILY GENERIC FOR ZOLOFT, Disp: 30 tablet, Rfl: 5  Allergies   Allergen Reactions   • Penicillins Anxiety       Past Medical History:   Diagnosis Date   • Depression    • Vertigo      No past surgical history on file.    Review of Systems   Constitutional: Negative.    HENT: Negative.    Eyes: Negative.    Respiratory: Negative.    Cardiovascular: Negative.    Gastrointestinal: Negative.    Endocrine: Negative.    Genitourinary: Negative.    Musculoskeletal: Negative.    Skin: Negative.    Allergic/Immunologic: Negative.    Neurological: Negative.    Hematological: Negative.    Psychiatric/Behavioral: Negative.        Objective  /84   Pulse 66   Resp 16   Ht 165.1 cm (65\")   Wt 68 kg (150 lb)   SpO2 98%   BMI 24.96 kg/m²   Physical Exam   Constitutional: She is oriented to person, place, and time. She appears well-developed and well-nourished.   HENT:   Head: Normocephalic and atraumatic.   Right Ear: External ear normal.   Left Ear: External ear normal.   Nose: Nose normal.   Mouth/Throat: Oropharynx is clear and moist.   Eyes: Conjunctivae and EOM are normal. Pupils are equal, round, and reactive to light.   Neck: Normal range of motion. Neck supple.   Cardiovascular: Normal rate, regular rhythm, normal heart sounds and intact distal pulses.   Pulmonary/Chest: Effort normal and breath sounds normal.   Abdominal: Soft. Bowel sounds are normal.   Musculoskeletal: Normal range of motion.   Neurological: She is alert and oriented to person, place, and time.   Skin: Skin is warm. Capillary refill takes less than 2 seconds.   Psychiatric: She has a normal mood and affect. Her behavior is " normal. Judgment and thought content normal.   Nursing note and vitals reviewed.      Assessment/Plan   Haley was seen today for follow-up.    Diagnoses and all orders for this visit:    Vitamin D deficiency  -     Vitamin D 25 hydroxy                 No orders of the defined types were placed in this encounter.      Follow up: 4 month(s)

## 2019-02-12 LAB — 25(OH)D3+25(OH)D2 SERPL-MCNC: 22.5 NG/ML (ref 30–100)

## 2019-02-14 ENCOUNTER — TELEPHONE (OUTPATIENT)
Dept: FAMILY MEDICINE CLINIC | Facility: CLINIC | Age: 49
End: 2019-02-14

## 2019-02-14 RX ORDER — ERGOCALCIFEROL 1.25 MG/1
50000 CAPSULE ORAL WEEKLY
Qty: 12 CAPSULE | Refills: 0 | Status: SHIPPED | OUTPATIENT
Start: 2019-02-14 | End: 2019-05-09 | Stop reason: SDUPTHER

## 2019-05-07 DIAGNOSIS — E55.9 VITAMIN D DEFICIENCY: Primary | ICD-10-CM

## 2019-05-08 LAB — 25(OH)D3+25(OH)D2 SERPL-MCNC: 28.7 NG/ML (ref 30–100)

## 2019-05-09 RX ORDER — ERGOCALCIFEROL 1.25 MG/1
50000 CAPSULE ORAL WEEKLY
Qty: 12 CAPSULE | Refills: 0 | Status: SHIPPED | OUTPATIENT
Start: 2019-05-09 | End: 2020-03-19 | Stop reason: SDUPTHER

## 2019-05-09 NOTE — PROGRESS NOTES
Pt informed Vit D is better, but still low. Continue the supplement and repeat in 3 mos. RX sent to her pharmacy

## 2019-06-27 ENCOUNTER — HOSPITAL ENCOUNTER (OUTPATIENT)
Dept: HOSPITAL 58 - RAD | Age: 49
Discharge: HOME | End: 2019-06-27

## 2019-06-27 VITALS — BODY MASS INDEX: 29.2 KG/M2

## 2019-06-27 DIAGNOSIS — Z12.31: Primary | ICD-10-CM

## 2019-07-29 RX ORDER — SERTRALINE HYDROCHLORIDE 25 MG/1
25 TABLET, FILM COATED ORAL DAILY
Qty: 30 TABLET | Refills: 1 | Status: SHIPPED | OUTPATIENT
Start: 2019-07-29 | End: 2019-10-17 | Stop reason: SDUPTHER

## 2019-08-02 DIAGNOSIS — E55.9 VITAMIN D DEFICIENCY: Primary | ICD-10-CM

## 2019-08-02 DIAGNOSIS — Z13.6 SCREENING, HEART DISEASE, ISCHEMIC: ICD-10-CM

## 2019-08-02 DIAGNOSIS — H53.9 VISION CHANGES: ICD-10-CM

## 2019-08-03 LAB
25(OH)D3+25(OH)D2 SERPL-MCNC: 34.9 NG/ML (ref 30–100)
ALBUMIN SERPL-MCNC: 4.2 G/DL (ref 3.5–5.2)
ALBUMIN/GLOB SERPL: 1.3 G/DL
ALP SERPL-CCNC: 82 U/L (ref 39–117)
ALT SERPL-CCNC: 15 U/L (ref 1–33)
AST SERPL-CCNC: 16 U/L (ref 1–32)
BASOPHILS # BLD AUTO: (no result) 10*3/UL
BILIRUB SERPL-MCNC: 0.3 MG/DL (ref 0.2–1.2)
BUN SERPL-MCNC: 12 MG/DL (ref 6–20)
BUN/CREAT SERPL: 17.9 (ref 7–25)
CALCIUM SERPL-MCNC: 8.6 MG/DL (ref 8.6–10.5)
CHLORIDE SERPL-SCNC: 106 MMOL/L (ref 98–107)
CHOLEST SERPL-MCNC: 161 MG/DL (ref 0–200)
CHOLEST/HDLC SERPL: 4.24 {RATIO}
CO2 SERPL-SCNC: 23.8 MMOL/L (ref 22–29)
CREAT SERPL-MCNC: 0.67 MG/DL (ref 0.57–1)
DIFFERENTIAL COMMENT: NORMAL
EOSINOPHIL # BLD AUTO: (no result) 10*3/UL
EOSINOPHIL # BLD MANUAL: 0.06 10*3/MM3 (ref 0–0.4)
EOSINOPHIL NFR BLD AUTO: (no result) %
EOSINOPHIL NFR BLD MANUAL: 1 % (ref 0.3–6.2)
ERYTHROCYTE [DISTWIDTH] IN BLOOD BY AUTOMATED COUNT: 14 % (ref 12.3–15.4)
GLOBULIN SER CALC-MCNC: 3.2 GM/DL
GLUCOSE SERPL-MCNC: 88 MG/DL (ref 65–99)
HCT VFR BLD AUTO: 41.6 % (ref 34–46.6)
HDLC SERPL-MCNC: 38 MG/DL (ref 40–60)
HGB BLD-MCNC: 13.2 G/DL (ref 12–15.9)
LDLC SERPL CALC-MCNC: 93 MG/DL (ref 0–100)
LYMPHOCYTES # BLD AUTO: (no result) 10*3/UL
LYMPHOCYTES # BLD MANUAL: 2.09 10*3/MM3 (ref 0.7–3.1)
LYMPHOCYTES NFR BLD AUTO: (no result) %
LYMPHOCYTES NFR BLD MANUAL: 33 % (ref 19.6–45.3)
MCH RBC QN AUTO: 28.7 PG (ref 26.6–33)
MCHC RBC AUTO-ENTMCNC: 31.7 G/DL (ref 31.5–35.7)
MCV RBC AUTO: 90.4 FL (ref 79–97)
MONOCYTES # BLD MANUAL: 0.63 10*3/MM3 (ref 0.1–0.9)
MONOCYTES NFR BLD AUTO: (no result) %
MONOCYTES NFR BLD MANUAL: 10 % (ref 5–12)
NEUTROPHILS # BLD MANUAL: 3.55 10*3/MM3 (ref 1.7–7)
NEUTROPHILS NFR BLD AUTO: (no result) %
NEUTROPHILS NFR BLD MANUAL: 56 % (ref 42.7–76)
PLATELET # BLD AUTO: 206 10*3/MM3 (ref 140–450)
PLATELET BLD QL SMEAR: NORMAL
POTASSIUM SERPL-SCNC: 4.5 MMOL/L (ref 3.5–5.2)
PROT SERPL-MCNC: 7.4 G/DL (ref 6–8.5)
RBC # BLD AUTO: 4.6 10*6/MM3 (ref 3.77–5.28)
RBC MORPH BLD: NORMAL
SODIUM SERPL-SCNC: 140 MMOL/L (ref 136–145)
TRIGL SERPL-MCNC: 152 MG/DL (ref 0–150)
TSH SERPL DL<=0.005 MIU/L-ACNC: 1.49 MIU/ML (ref 0.27–4.2)
VLDLC SERPL CALC-MCNC: 30.4 MG/DL
WBC # BLD AUTO: 6.34 10*3/MM3 (ref 3.4–10.8)

## 2019-09-05 RX ORDER — MECLIZINE HYDROCHLORIDE 25 MG/1
25 TABLET ORAL 3 TIMES DAILY PRN
Qty: 30 TABLET | Refills: 0 | Status: SHIPPED | OUTPATIENT
Start: 2019-09-05 | End: 2020-03-19 | Stop reason: SDUPTHER

## 2019-10-17 RX ORDER — SERTRALINE HYDROCHLORIDE 25 MG/1
TABLET, FILM COATED ORAL
Qty: 30 TABLET | Refills: 1 | Status: SHIPPED | OUTPATIENT
Start: 2019-10-17 | End: 2019-11-11 | Stop reason: SDUPTHER

## 2019-10-22 NOTE — TELEPHONE ENCOUNTER
Is This A New Presentation, Or A Follow-Up?: Rash
Pt is needing her zoloft refilled to [facility]. Has been done. Pt informed to check with her pharmacy  
How Severe Is Your Rash?: moderate

## 2019-11-11 RX ORDER — SERTRALINE HYDROCHLORIDE 25 MG/1
25 TABLET, FILM COATED ORAL DAILY
Qty: 30 TABLET | Refills: 1 | Status: SHIPPED | OUTPATIENT
Start: 2019-11-11 | End: 2020-03-18

## 2020-03-18 RX ORDER — SERTRALINE HYDROCHLORIDE 25 MG/1
TABLET, FILM COATED ORAL
Qty: 15 TABLET | Refills: 0 | Status: SHIPPED | OUTPATIENT
Start: 2020-03-18 | End: 2020-03-19 | Stop reason: SDUPTHER

## 2020-03-19 RX ORDER — MECLIZINE HYDROCHLORIDE 25 MG/1
25 TABLET ORAL 3 TIMES DAILY PRN
Qty: 15 TABLET | Refills: 0 | Status: SHIPPED | OUTPATIENT
Start: 2020-03-19 | End: 2020-04-16 | Stop reason: SDUPTHER

## 2020-03-19 RX ORDER — SERTRALINE HYDROCHLORIDE 25 MG/1
25 TABLET, FILM COATED ORAL DAILY
Qty: 15 TABLET | Refills: 0 | Status: SHIPPED | OUTPATIENT
Start: 2020-03-19 | End: 2020-04-16 | Stop reason: SDUPTHER

## 2020-03-19 RX ORDER — ERGOCALCIFEROL 1.25 MG/1
50000 CAPSULE ORAL WEEKLY
Qty: 5 CAPSULE | Refills: 0 | Status: SHIPPED | OUTPATIENT
Start: 2020-03-19 | End: 2020-04-16 | Stop reason: SDUPTHER

## 2020-04-16 ENCOUNTER — TELEPHONE (OUTPATIENT)
Dept: FAMILY MEDICINE CLINIC | Facility: CLINIC | Age: 50
End: 2020-04-16

## 2020-04-16 RX ORDER — ERGOCALCIFEROL 1.25 MG/1
50000 CAPSULE ORAL WEEKLY
Qty: 5 CAPSULE | Refills: 0 | Status: SHIPPED | OUTPATIENT
Start: 2020-04-16 | End: 2021-05-18

## 2020-04-16 RX ORDER — SERTRALINE HYDROCHLORIDE 25 MG/1
25 TABLET, FILM COATED ORAL DAILY
Qty: 30 TABLET | Refills: 0 | Status: SHIPPED | OUTPATIENT
Start: 2020-04-16 | End: 2020-05-26

## 2020-04-16 RX ORDER — MECLIZINE HYDROCHLORIDE 25 MG/1
25 TABLET ORAL 3 TIMES DAILY PRN
Qty: 30 TABLET | Refills: 0 | Status: SHIPPED | OUTPATIENT
Start: 2020-04-16 | End: 2021-05-18

## 2020-05-19 ENCOUNTER — OFFICE VISIT (OUTPATIENT)
Dept: FAMILY MEDICINE CLINIC | Facility: CLINIC | Age: 50
End: 2020-05-19

## 2020-05-19 VITALS — WEIGHT: 150 LBS | BODY MASS INDEX: 24.96 KG/M2

## 2020-05-19 DIAGNOSIS — F32.89 OTHER DEPRESSION: Primary | ICD-10-CM

## 2020-05-19 PROCEDURE — 99422 OL DIG E/M SVC 11-20 MIN: CPT | Performed by: FAMILY MEDICINE

## 2020-05-19 NOTE — PROGRESS NOTES
Subjective   Haley Jewell is a 49 y.o. female.   You have chosen to receive care through a telephone visit. Do you consent to use a telephone visit for your medical care today? Yes  Chief Complaint   Patient presents with   • Med Refill        History of Present Illness     she is tolerating zoloftr well for her depression      Current Outpatient Medications:   •  meclizine (ANTIVERT) 25 MG tablet, Take 1 tablet by mouth 3 (Three) Times a Day As Needed for Dizziness., Disp: 30 tablet, Rfl: 0  •  sertraline (ZOLOFT) 25 MG tablet, Take 1 tablet by mouth Daily., Disp: 30 tablet, Rfl: 0  •  vitamin D (ERGOCALCIFEROL) 1.25 MG (78971 UT) capsule capsule, Take 1 capsule by mouth 1 (One) Time Per Week., Disp: 5 capsule, Rfl: 0  Allergies   Allergen Reactions   • Penicillins Anxiety       Past Medical History:   Diagnosis Date   • Depression    • Vertigo      No past surgical history on file.    Review of Systems   Constitutional: Negative.    HENT: Negative.    Eyes: Negative.    Respiratory: Negative.    Cardiovascular: Negative.    Gastrointestinal: Negative.    Endocrine: Negative.    Genitourinary: Negative.    Musculoskeletal: Negative.    Skin: Negative.    Allergic/Immunologic: Negative.    Neurological: Negative.    Hematological: Negative.    Psychiatric/Behavioral: Positive for dysphoric mood.       Objective  Wt 68 kg (150 lb)   BMI 24.96 kg/m²   Physical Exam    Assessment/Plan   Haley was seen today for med refill.    Diagnoses and all orders for this visit:    Other depression    This visit has been rescheduled as a phone visit to comply with patient safety concerns in accordance with CDC recommendations. Total time of discussion was 15  minutes.             No orders of the defined types were placed in this encounter.      Follow up: 2 month(s)

## 2020-05-26 RX ORDER — SERTRALINE HYDROCHLORIDE 25 MG/1
TABLET, FILM COATED ORAL
Qty: 30 TABLET | Refills: 5 | Status: SHIPPED | OUTPATIENT
Start: 2020-05-26 | End: 2021-02-01

## 2020-10-27 ENCOUNTER — RESULTS ENCOUNTER (OUTPATIENT)
Dept: FAMILY MEDICINE CLINIC | Facility: CLINIC | Age: 50
End: 2020-10-27

## 2020-10-27 DIAGNOSIS — Z20.822 EXPOSURE TO COVID-19 VIRUS: ICD-10-CM

## 2020-10-27 DIAGNOSIS — Z20.822 EXPOSURE TO COVID-19 VIRUS: Primary | ICD-10-CM

## 2020-10-30 ENCOUNTER — TELEPHONE (OUTPATIENT)
Dept: FAMILY MEDICINE CLINIC | Facility: CLINIC | Age: 50
End: 2020-10-30

## 2021-02-01 RX ORDER — SERTRALINE HYDROCHLORIDE 25 MG/1
TABLET, FILM COATED ORAL
Qty: 30 TABLET | Refills: 0 | Status: SHIPPED | OUTPATIENT
Start: 2021-02-01 | End: 2021-03-16

## 2021-02-01 NOTE — TELEPHONE ENCOUNTER
Requested Prescriptions     Pending Prescriptions Disp Refills   • sertraline (ZOLOFT) 25 MG tablet [Pharmacy Med Name: SERTRALINE 25MG TABLET] 30 tablet 0     Sig: TAKE ONE TABLET DAILY GENERIC FOR ZOLOFT GENERIC FOR ZOLOFT

## 2021-03-16 RX ORDER — SERTRALINE HYDROCHLORIDE 25 MG/1
25 TABLET, FILM COATED ORAL DAILY
Qty: 30 TABLET | Refills: 0 | Status: SHIPPED | OUTPATIENT
Start: 2021-03-16 | End: 2021-05-03

## 2021-03-16 NOTE — TELEPHONE ENCOUNTER
Last OV 5/19/20, no-showed for last appt.      Requested Prescriptions     Pending Prescriptions Disp Refills   • sertraline (ZOLOFT) 25 MG tablet [Pharmacy Med Name: SERTRALINE 25MG TABLET] 30 tablet 0     Sig: Take 1 tablet by mouth Daily. Call office for appointment.

## 2021-05-03 RX ORDER — SERTRALINE HYDROCHLORIDE 25 MG/1
TABLET, FILM COATED ORAL
Qty: 15 TABLET | Refills: 0 | Status: SHIPPED | OUTPATIENT
Start: 2021-05-03 | End: 2021-05-18 | Stop reason: SDUPTHER

## 2021-05-15 ENCOUNTER — APPOINTMENT (OUTPATIENT)
Dept: GENERAL RADIOLOGY | Facility: HOSPITAL | Age: 51
End: 2021-05-15

## 2021-05-15 ENCOUNTER — HOSPITAL ENCOUNTER (EMERGENCY)
Facility: HOSPITAL | Age: 51
Discharge: HOME | End: 2021-05-15
Admitting: EMERGENCY MEDICINE

## 2021-05-15 VITALS
BODY MASS INDEX: 27.46 KG/M2 | HEART RATE: 58 BPM | DIASTOLIC BLOOD PRESSURE: 84 MMHG | RESPIRATION RATE: 15 BRPM | SYSTOLIC BLOOD PRESSURE: 146 MMHG | HEIGHT: 63 IN | WEIGHT: 155 LBS | OXYGEN SATURATION: 100 % | TEMPERATURE: 98.1 F

## 2021-05-15 DIAGNOSIS — S86.912A STRAIN OF LEFT KNEE, INITIAL ENCOUNTER: Primary | ICD-10-CM

## 2021-05-15 PROCEDURE — 99282 EMERGENCY DEPT VISIT SF MDM: CPT

## 2021-05-15 PROCEDURE — 73562 X-RAY EXAM OF KNEE 3: CPT

## 2021-05-15 RX ORDER — KETOROLAC TROMETHAMINE 10 MG/1
10 TABLET, FILM COATED ORAL EVERY 6 HOURS PRN
Qty: 12 TABLET | Refills: 0 | Status: SHIPPED | OUTPATIENT
Start: 2021-05-15 | End: 2021-05-15 | Stop reason: SDUPTHER

## 2021-05-15 RX ORDER — KETOROLAC TROMETHAMINE 10 MG/1
10 TABLET, FILM COATED ORAL EVERY 6 HOURS PRN
Qty: 12 TABLET | Refills: 0 | Status: SHIPPED | OUTPATIENT
Start: 2021-05-15 | End: 2021-05-18

## 2021-05-15 NOTE — ED PROVIDER NOTES
Subjective     History provided by:  Patient (daughter)   used: Yes (pt daughter translated for pt; pt did not want to use  phone)    Knee Pain  Location:  Knee  Time since incident:  2 months  Injury: no    Knee location:  L knee  Pain details:     Quality:  Aching and dull    Radiates to:  Does not radiate    Severity:  Mild    Onset quality:  Sudden    Duration:  2 months    Timing:  Constant    Progression:  Unchanged  Chronicity:  New  Dislocation: no    Prior injury to area:  No  Relieved by:  Nothing  Worsened by:  Nothing  Ineffective treatments:  NSAIDs  Associated symptoms: no back pain, no decreased ROM, no fatigue, no fever, no itching, no muscle weakness, no neck pain, no numbness, no stiffness, no swelling and no tingling    Risk factors: no concern for non-accidental trauma, no frequent fractures, no known bone disorder, no obesity and no recent illness        Review of Systems   Constitutional: Negative for fatigue and fever.   Musculoskeletal: Negative for back pain, neck pain and stiffness.   Skin: Negative for itching.   All other systems reviewed and are negative.      Past Medical History:   Diagnosis Date   • Depression    • Vertigo        Allergies   Allergen Reactions   • Penicillins Anxiety       History reviewed. No pertinent surgical history.    History reviewed. No pertinent family history.    Social History     Socioeconomic History   • Marital status:      Spouse name: Not on file   • Number of children: Not on file   • Years of education: Not on file   • Highest education level: Not on file   Tobacco Use   • Smoking status: Never Smoker   Substance and Sexual Activity   • Alcohol use: No   • Drug use: No           Objective   Physical Exam  Vitals and nursing note reviewed.   Constitutional:       Appearance: Normal appearance.   HENT:      Head: Normocephalic and atraumatic.   Eyes:      Conjunctiva/sclera: Conjunctivae normal.   Cardiovascular:       Rate and Rhythm: Normal rate.   Pulmonary:      Effort: Pulmonary effort is normal.   Musculoskeletal:      Comments: Pain to anterior lt knee, no swelling, erythema or ecchymosis noted, pain when attempting to bend knee, +CMS, neurovascularly intact   Skin:     General: Skin is warm.      Capillary Refill: Capillary refill takes less than 2 seconds.   Neurological:      General: No focal deficit present.      Mental Status: She is alert.   Psychiatric:         Mood and Affect: Mood normal.         Procedures           ED Course  ED Course as of May 15 1836   Sat May 15, 2021   1835 Patient's x-ray shows no acute findings.  She does have a knee brace at home that she can use.  This time she be discharged home in stable condition.  I referred her to orthopedics to follow-up.  The patient will be given a prescription for Toradol.  She is advised return the ER for any new or worsening symptoms.  She will be discharged home at this time in stable condition.    [LF]      ED Course User Index  [LF] Aurora Winter, APRN                                   XR Knee 3 View Left   Final Result   1. No acute osseous pathology of the left knee.   This report was finalized on 05/15/2021 17:54 by Dr. Kat Pickard MD.        Labs Reviewed - No data to display          MDM  Number of Diagnoses or Management Options  Strain of left knee, initial encounter: new and requires workup     Amount and/or Complexity of Data Reviewed  Tests in the radiology section of CPT®: ordered and reviewed    Patient Progress  Patient progress: stable      Final diagnoses:   Strain of left knee, initial encounter       ED Disposition  ED Disposition     ED Disposition Condition Comment    Discharge Stable           Harvey Mora MD  [address] [address]  [phone]    Call in 1 day           Medication List      New Prescriptions    ketorolac 10 MG tablet  Commonly known as: TORADOL  Take 1 tablet by mouth Every 6 (Six)  Hours As Needed for Moderate Pain  for up to 3 days.           Where to Get Your Medications      These medications were sent to [id number] - [place], [place] - [address] AT ACROSS FROM [address]  [phone] [facility] [phone]   [address] [place] [address]    Hours: 24-hours Phone: [phone]   · ketorolac 10 MG tablet          Aurora Winter, APRN  05/15/21 2919

## 2021-05-18 ENCOUNTER — OFFICE VISIT (OUTPATIENT)
Dept: FAMILY MEDICINE CLINIC | Facility: CLINIC | Age: 51
End: 2021-05-18

## 2021-05-18 VITALS
HEART RATE: 56 BPM | OXYGEN SATURATION: 100 % | HEIGHT: 63 IN | DIASTOLIC BLOOD PRESSURE: 78 MMHG | WEIGHT: 154 LBS | BODY MASS INDEX: 27.29 KG/M2 | SYSTOLIC BLOOD PRESSURE: 138 MMHG | RESPIRATION RATE: 18 BRPM | TEMPERATURE: 97.4 F

## 2021-05-18 DIAGNOSIS — M25.562 ACUTE PAIN OF LEFT KNEE: Primary | ICD-10-CM

## 2021-05-18 DIAGNOSIS — F33.1 MODERATE EPISODE OF RECURRENT MAJOR DEPRESSIVE DISORDER (HCC): ICD-10-CM

## 2021-05-18 PROCEDURE — 99213 OFFICE O/P EST LOW 20 MIN: CPT | Performed by: NURSE PRACTITIONER

## 2021-05-18 RX ORDER — MELOXICAM 15 MG/1
15 TABLET ORAL DAILY
Qty: 30 TABLET | Refills: 0 | Status: SHIPPED | OUTPATIENT
Start: 2021-05-18 | End: 2021-07-14

## 2021-05-18 RX ORDER — SERTRALINE HYDROCHLORIDE 25 MG/1
25 TABLET, FILM COATED ORAL DAILY
Qty: 30 TABLET | Refills: 5 | Status: SHIPPED | OUTPATIENT
Start: 2021-05-18 | End: 2021-12-10

## 2021-07-14 ENCOUNTER — OFFICE VISIT (OUTPATIENT)
Dept: FAMILY MEDICINE CLINIC | Facility: CLINIC | Age: 51
End: 2021-07-14

## 2021-07-14 VITALS
BODY MASS INDEX: 27.64 KG/M2 | WEIGHT: 156 LBS | HEIGHT: 63 IN | DIASTOLIC BLOOD PRESSURE: 102 MMHG | SYSTOLIC BLOOD PRESSURE: 156 MMHG | RESPIRATION RATE: 16 BRPM | HEART RATE: 86 BPM | OXYGEN SATURATION: 98 %

## 2021-07-14 DIAGNOSIS — I10 ESSENTIAL HYPERTENSION: Primary | ICD-10-CM

## 2021-07-14 DIAGNOSIS — E66.3 OVERWEIGHT (BMI 25.0-29.9): ICD-10-CM

## 2021-07-14 DIAGNOSIS — F33.1 MODERATE EPISODE OF RECURRENT MAJOR DEPRESSIVE DISORDER (HCC): ICD-10-CM

## 2021-07-14 PROCEDURE — 99213 OFFICE O/P EST LOW 20 MIN: CPT | Performed by: NURSE PRACTITIONER

## 2021-07-14 RX ORDER — MELOXICAM 15 MG/1
15 TABLET ORAL DAILY
Qty: 30 TABLET | Refills: 0 | Status: SHIPPED | OUTPATIENT
Start: 2021-07-14

## 2021-07-14 RX ORDER — LISINOPRIL 10 MG/1
10 TABLET ORAL DAILY
Qty: 30 TABLET | Refills: 5 | Status: SHIPPED | OUTPATIENT
Start: 2021-07-14 | End: 2021-12-20

## 2021-07-14 NOTE — PROGRESS NOTES
"Subjective   Chief Complaint:  Red right eye, headaches    History of Present Illness:  This 50 y.o. female was seen in the office today.  Presents today with elevated blood pressure.  She reports twice in the last year she has had a headache where it felt like a band around her head with a burst blood vessel on her right eye.  She reports she was told by an eye doctor that this was benign as far as eyesight but she did need to get her blood pressure checked.  She was referred for knee pain but doing well on Mobic, she reports she no longer needs the referral.    Allergies   Allergen Reactions   • Penicillins Anxiety      Current Outpatient Medications on File Prior to Visit   Medication Sig   • sertraline (ZOLOFT) 25 MG tablet Take 1 tablet by mouth Daily.   • [DISCONTINUED] meloxicam (Mobic) 15 MG tablet Take 1 tablet by mouth Daily.     No current facility-administered medications on file prior to visit.      Past Medical, Surgical, Social, and Family History:  Past Medical History:   Diagnosis Date   • Depression    • Vertigo      History reviewed. No pertinent surgical history.  Social History     Socioeconomic History   • Marital status:      Spouse name: Not on file   • Number of children: Not on file   • Years of education: Not on file   • Highest education level: Not on file   Tobacco Use   • Smoking status: Never Smoker   Substance and Sexual Activity   • Alcohol use: No   • Drug use: No     History reviewed. No pertinent family history.  Objective   Physical Exam  Vitals reviewed.   Constitutional:       General: She is not in acute distress.     Appearance: Normal appearance.   Eyes:      Comments: Superficial scleral hemorrhage   Cardiovascular:      Rate and Rhythm: Normal rate and regular rhythm.   Pulmonary:      Effort: Pulmonary effort is normal.      Breath sounds: Normal breath sounds.     BP (!) 156/102   Pulse 86   Resp 16   Ht 160 cm (63\")   Wt 70.8 kg (156 lb)   SpO2 98%   BMI " 27.63 kg/m²     Assessment/Plan   Diagnoses and all orders for this visit:    1. Essential hypertension (Primary)  -     CBC & Differential  -     Comprehensive Metabolic Panel  -     Lipid Panel  -     TSH  -     lisinopril (PRINIVIL,ZESTRIL) 10 MG tablet; Take 1 tablet by mouth Daily.  Dispense: 30 tablet; Refill: 5    2. Overweight (BMI 25.0-29.9)  -     CBC & Differential  -     Comprehensive Metabolic Panel  -     Lipid Panel  -     TSH    3. Moderate episode of recurrent major depressive disorder (CMS/HCC)  -     meloxicam (Mobic) 15 MG tablet; Take 1 tablet by mouth Daily.  Dispense: 30 tablet; Refill: 0    Discussion:  Advised and educated plan of care.  We will refill the Mobic, I did advise this could be contributory to the blood pressure issue although she has had issues prior to going on the Mobic.  Will start lisinopril 10 mg p.o. daily, fasting labs in a.m., follow-up 3 weeks.  Advised weight loss.    Follow-up:  Return for 3 Weeks - VALERIA Pollard.    Electronically signed by VALERIA Ricks, 07/14/21, 2:42 PM CDT.

## 2021-07-16 ENCOUNTER — LAB (OUTPATIENT)
Dept: FAMILY MEDICINE CLINIC | Facility: CLINIC | Age: 51
End: 2021-07-16

## 2021-07-17 LAB
ALBUMIN SERPL-MCNC: 4.3 G/DL (ref 3.5–5.2)
ALBUMIN/GLOB SERPL: 1.2 G/DL
ALP SERPL-CCNC: 76 U/L (ref 39–117)
ALT SERPL-CCNC: 44 U/L (ref 1–33)
AST SERPL-CCNC: 27 U/L (ref 1–32)
BASOPHILS # BLD AUTO: 0.06 10*3/MM3 (ref 0–0.2)
BASOPHILS NFR BLD AUTO: 0.7 % (ref 0–1.5)
BILIRUB SERPL-MCNC: 0.3 MG/DL (ref 0–1.2)
BUN SERPL-MCNC: 15 MG/DL (ref 6–20)
BUN/CREAT SERPL: 19.7 (ref 7–25)
CALCIUM SERPL-MCNC: 9.8 MG/DL (ref 8.6–10.5)
CHLORIDE SERPL-SCNC: 103 MMOL/L (ref 98–107)
CHOLEST SERPL-MCNC: 194 MG/DL (ref 0–200)
CO2 SERPL-SCNC: 26.2 MMOL/L (ref 22–29)
CREAT SERPL-MCNC: 0.76 MG/DL (ref 0.57–1)
EOSINOPHIL # BLD AUTO: 0.18 10*3/MM3 (ref 0–0.4)
EOSINOPHIL NFR BLD AUTO: 2 % (ref 0.3–6.2)
ERYTHROCYTE [DISTWIDTH] IN BLOOD BY AUTOMATED COUNT: 13.4 % (ref 12.3–15.4)
GLOBULIN SER CALC-MCNC: 3.6 GM/DL
GLUCOSE SERPL-MCNC: 88 MG/DL (ref 65–99)
HCT VFR BLD AUTO: 41.1 % (ref 34–46.6)
HDLC SERPL-MCNC: 31 MG/DL (ref 40–60)
HGB BLD-MCNC: 13.7 G/DL (ref 12–15.9)
IMM GRANULOCYTES # BLD AUTO: 0.04 10*3/MM3 (ref 0–0.05)
IMM GRANULOCYTES NFR BLD AUTO: 0.4 % (ref 0–0.5)
LDLC SERPL CALC-MCNC: 111 MG/DL (ref 0–100)
LYMPHOCYTES # BLD AUTO: 2.98 10*3/MM3 (ref 0.7–3.1)
LYMPHOCYTES NFR BLD AUTO: 33 % (ref 19.6–45.3)
MCH RBC QN AUTO: 28.9 PG (ref 26.6–33)
MCHC RBC AUTO-ENTMCNC: 33.3 G/DL (ref 31.5–35.7)
MCV RBC AUTO: 86.7 FL (ref 79–97)
MONOCYTES # BLD AUTO: 0.69 10*3/MM3 (ref 0.1–0.9)
MONOCYTES NFR BLD AUTO: 7.6 % (ref 5–12)
NEUTROPHILS # BLD AUTO: 5.09 10*3/MM3 (ref 1.7–7)
NEUTROPHILS NFR BLD AUTO: 56.3 % (ref 42.7–76)
NRBC BLD AUTO-RTO: 0 /100 WBC (ref 0–0.2)
PLATELET # BLD AUTO: 231 10*3/MM3 (ref 140–450)
POTASSIUM SERPL-SCNC: 4.6 MMOL/L (ref 3.5–5.2)
PROT SERPL-MCNC: 7.9 G/DL (ref 6–8.5)
RBC # BLD AUTO: 4.74 10*6/MM3 (ref 3.77–5.28)
SODIUM SERPL-SCNC: 138 MMOL/L (ref 136–145)
TRIGL SERPL-MCNC: 299 MG/DL (ref 0–150)
TSH SERPL DL<=0.005 MIU/L-ACNC: 1.65 UIU/ML (ref 0.27–4.2)
VLDLC SERPL CALC-MCNC: 52 MG/DL (ref 5–40)
WBC # BLD AUTO: 9.04 10*3/MM3 (ref 3.4–10.8)

## 2021-07-19 NOTE — PROGRESS NOTES
Please call the patient - Trigs as came up-liver enzyme slightly elevated - this is usually due to irritation from the lipids.  We can talk more about lifestyle diet/exercise on her f/u visit in August.      Electronically signed by VALERIA Ricks, 07/19/21, 8:21 AM CDT.

## 2021-08-04 ENCOUNTER — OFFICE VISIT (OUTPATIENT)
Dept: FAMILY MEDICINE CLINIC | Facility: CLINIC | Age: 51
End: 2021-08-04

## 2021-08-04 VITALS
DIASTOLIC BLOOD PRESSURE: 80 MMHG | SYSTOLIC BLOOD PRESSURE: 148 MMHG | WEIGHT: 156 LBS | RESPIRATION RATE: 16 BRPM | HEART RATE: 74 BPM | BODY MASS INDEX: 27.63 KG/M2 | TEMPERATURE: 98.6 F

## 2021-08-04 DIAGNOSIS — E78.2 MIXED HYPERLIPIDEMIA: ICD-10-CM

## 2021-08-04 DIAGNOSIS — I10 ESSENTIAL HYPERTENSION: ICD-10-CM

## 2021-08-04 DIAGNOSIS — J02.9 ACUTE PHARYNGITIS, UNSPECIFIED ETIOLOGY: Primary | ICD-10-CM

## 2021-08-04 PROCEDURE — 99214 OFFICE O/P EST MOD 30 MIN: CPT | Performed by: NURSE PRACTITIONER

## 2021-08-04 RX ORDER — AZITHROMYCIN 250 MG/1
TABLET, FILM COATED ORAL
Qty: 6 TABLET | Refills: 0 | Status: SHIPPED | OUTPATIENT
Start: 2021-08-04 | End: 2021-08-09

## 2021-08-04 RX ORDER — OMEGA-3-ACID ETHYL ESTERS 1 G/1
2 CAPSULE, LIQUID FILLED ORAL 2 TIMES DAILY
Qty: 120 CAPSULE | Refills: 5 | Status: SHIPPED | OUTPATIENT
Start: 2021-08-04 | End: 2022-12-13

## 2021-12-09 DIAGNOSIS — F33.1 MODERATE EPISODE OF RECURRENT MAJOR DEPRESSIVE DISORDER (HCC): ICD-10-CM

## 2021-12-10 RX ORDER — SERTRALINE HYDROCHLORIDE 25 MG/1
TABLET, FILM COATED ORAL
Qty: 30 TABLET | Refills: 2 | Status: SHIPPED | OUTPATIENT
Start: 2021-12-10 | End: 2022-06-27

## 2021-12-10 NOTE — TELEPHONE ENCOUNTER
Rx Refill Note  Requested Prescriptions     Pending Prescriptions Disp Refills   • sertraline (ZOLOFT) 25 MG tablet [Pharmacy Med Name: SERTRALINE HCL 25MG TABLET] 30 tablet 5     Sig: TAKE ONE TABLET DAILY GENERIC FOR ZOLOFT      Last office visit with prescribing clinician: 8/4/2021      Next office visit with prescribing clinician: Visit date not found            Micaela Haywood MA  12/10/21, 08:57 CST

## 2021-12-20 DIAGNOSIS — I10 ESSENTIAL HYPERTENSION: ICD-10-CM

## 2021-12-20 RX ORDER — LISINOPRIL 10 MG/1
TABLET ORAL
Qty: 30 TABLET | Refills: 2 | Status: SHIPPED | OUTPATIENT
Start: 2021-12-20 | End: 2022-03-30

## 2021-12-20 NOTE — TELEPHONE ENCOUNTER
Rx Refill Note  Requested Prescriptions     Pending Prescriptions Disp Refills   • lisinopril (PRINIVIL,ZESTRIL) 10 MG tablet [Pharmacy Med Name: LISINOPRIL 10MG TABLET] 30 tablet 5     Sig: TAKE ONE TABLET DAILY GENERIC FOR ZESTRIL      Last office visit with prescribing clinician: 8/4/2021      Next office visit with prescribing clinician: Visit date not found            Micaela Haywood MA  12/20/21, 11:06 CST

## 2022-01-05 ENCOUNTER — OFFICE VISIT (OUTPATIENT)
Dept: FAMILY MEDICINE CLINIC | Facility: CLINIC | Age: 52
End: 2022-01-05

## 2022-01-05 VITALS
BODY MASS INDEX: 26.4 KG/M2 | WEIGHT: 149 LBS | RESPIRATION RATE: 16 BRPM | OXYGEN SATURATION: 97 % | SYSTOLIC BLOOD PRESSURE: 132 MMHG | HEART RATE: 68 BPM | HEIGHT: 63 IN | DIASTOLIC BLOOD PRESSURE: 84 MMHG

## 2022-01-05 DIAGNOSIS — F32.89 OTHER DEPRESSION: Primary | ICD-10-CM

## 2022-01-05 DIAGNOSIS — I10 PRIMARY HYPERTENSION: ICD-10-CM

## 2022-01-05 PROCEDURE — 99213 OFFICE O/P EST LOW 20 MIN: CPT | Performed by: FAMILY MEDICINE

## 2022-01-05 NOTE — PROGRESS NOTES
"Subjective   Haley Jewell is a 51 y.o. female.     Chief Complaint   Patient presents with   • Depression     5 mo f/u        History of Present Illness     she notes good bp control without cp o rha--her depression is toleated nicely       Current Outpatient Medications:   •  lisinopril (PRINIVIL,ZESTRIL) 10 MG tablet, TAKE ONE TABLET DAILY GENERIC FOR ZESTRIL, Disp: 30 tablet, Rfl: 2  •  meloxicam (Mobic) 15 MG tablet, Take 1 tablet by mouth Daily., Disp: 30 tablet, Rfl: 0  •  omega-3 acid ethyl esters (Lovaza) 1 g capsule, Take 2 capsules by mouth 2 (Two) Times a Day., Disp: 120 capsule, Rfl: 5  •  sertraline (ZOLOFT) 25 MG tablet, TAKE ONE TABLET DAILY GENERIC FOR ZOLOFT, Disp: 30 tablet, Rfl: 2  Allergies   Allergen Reactions   • Penicillins Anxiety       Past Medical History:   Diagnosis Date   • Depression    • Vertigo      No past surgical history on file.    Review of Systems   Constitutional: Negative.    HENT: Negative.    Eyes: Negative.    Respiratory: Negative.    Cardiovascular: Negative.    Gastrointestinal: Negative.    Endocrine: Negative.    Genitourinary: Negative.    Musculoskeletal: Negative.    Skin: Negative.    Allergic/Immunologic: Negative.    Neurological: Negative.    Hematological: Negative.    Psychiatric/Behavioral: Negative.        Objective  /84   Pulse 68   Resp 16   Ht 160 cm (63\")   Wt 67.6 kg (149 lb)   SpO2 97%   BMI 26.39 kg/m²   Physical Exam  Vitals and nursing note reviewed.   Constitutional:       Appearance: Normal appearance. She is normal weight.   HENT:      Head: Normocephalic and atraumatic.      Nose: Nose normal.      Mouth/Throat:      Mouth: Mucous membranes are moist.   Eyes:      Extraocular Movements: Extraocular movements intact.      Pupils: Pupils are equal, round, and reactive to light.   Cardiovascular:      Rate and Rhythm: Normal rate and regular rhythm.      Pulses: Normal pulses.      Heart sounds: Normal heart sounds.   Pulmonary:     "  Effort: Pulmonary effort is normal.      Breath sounds: Normal breath sounds.   Abdominal:      General: Abdomen is flat. Bowel sounds are normal.   Musculoskeletal:         General: Normal range of motion.      Cervical back: Normal range of motion and neck supple.   Skin:     General: Skin is warm.      Capillary Refill: Capillary refill takes less than 2 seconds.   Neurological:      General: No focal deficit present.      Mental Status: She is alert and oriented to person, place, and time. Mental status is at baseline.   Psychiatric:         Mood and Affect: Mood normal.         Assessment/Plan   Diagnoses and all orders for this visit:    1. Other depression (Primary)  -     Comprehensive metabolic panel  -     Lipid Panel With / Chol / HDL Ratio    2. Primary hypertension  -     Comprehensive metabolic panel  -     Lipid Panel With / Chol / HDL Ratio        He will monitor bp and continue antidpressants         Orders Placed This Encounter   Procedures   • Comprehensive metabolic panel     Order Specific Question:   Release to patient     Answer:   Immediate   • Lipid Panel With / Chol / HDL Ratio     Order Specific Question:   Release to patient     Answer:   Immediate       Follow up: 6 month(s)

## 2022-01-06 LAB
ALBUMIN SERPL-MCNC: 4.7 G/DL (ref 3.5–5.2)
ALBUMIN/GLOB SERPL: 1.7 G/DL
ALP SERPL-CCNC: 84 U/L (ref 39–117)
ALT SERPL-CCNC: 21 U/L (ref 1–33)
AST SERPL-CCNC: 17 U/L (ref 1–32)
BILIRUB SERPL-MCNC: 0.4 MG/DL (ref 0–1.2)
BUN SERPL-MCNC: 11 MG/DL (ref 6–20)
BUN/CREAT SERPL: 16.7 (ref 7–25)
CALCIUM SERPL-MCNC: 9.3 MG/DL (ref 8.6–10.5)
CHLORIDE SERPL-SCNC: 105 MMOL/L (ref 98–107)
CHOLEST SERPL-MCNC: 219 MG/DL (ref 0–200)
CHOLEST/HDLC SERPL: 5.62 {RATIO}
CO2 SERPL-SCNC: 28.1 MMOL/L (ref 22–29)
CREAT SERPL-MCNC: 0.66 MG/DL (ref 0.57–1)
GLOBULIN SER CALC-MCNC: 2.7 GM/DL
GLUCOSE SERPL-MCNC: 87 MG/DL (ref 65–99)
HDLC SERPL-MCNC: 39 MG/DL (ref 40–60)
LDLC SERPL CALC-MCNC: 142 MG/DL (ref 0–100)
POTASSIUM SERPL-SCNC: 4.3 MMOL/L (ref 3.5–5.2)
PROT SERPL-MCNC: 7.4 G/DL (ref 6–8.5)
SODIUM SERPL-SCNC: 140 MMOL/L (ref 136–145)
TRIGL SERPL-MCNC: 210 MG/DL (ref 0–150)
VLDLC SERPL CALC-MCNC: 38 MG/DL (ref 5–40)

## 2022-01-10 ENCOUNTER — TELEPHONE (OUTPATIENT)
Dept: FAMILY MEDICINE CLINIC | Facility: CLINIC | Age: 52
End: 2022-01-10

## 2022-01-10 NOTE — TELEPHONE ENCOUNTER
Caller: JewellChristopherTamara    Relationship: Emergency Contact    Best call back number: [phone]    Who are you requesting to speak with     CLINICAL STAFF    Do you know the name of the person who called:     TAMARA    What was the call regarding:     RESULTS STATED HIGH CHOLESTEROL. DOES SHE NEED MEDICATION OR WHAT TYPE OF DIET OF SHOULD SHE FOLLOW?     Do you require a callback:    YES, PLEASE ADVISE        DELETE AFTER READING TO PATIENT: “ Thank you for sharing this information with me. I will send a message to the . Please allow up to 48 hours for the  to follow up on this request.”

## 2022-02-08 ENCOUNTER — OFFICE VISIT (OUTPATIENT)
Dept: FAMILY MEDICINE CLINIC | Facility: CLINIC | Age: 52
End: 2022-02-08

## 2022-02-08 VITALS
TEMPERATURE: 98.3 F | RESPIRATION RATE: 16 BRPM | SYSTOLIC BLOOD PRESSURE: 126 MMHG | BODY MASS INDEX: 26.93 KG/M2 | WEIGHT: 152 LBS | DIASTOLIC BLOOD PRESSURE: 80 MMHG | HEART RATE: 75 BPM | OXYGEN SATURATION: 98 % | HEIGHT: 63 IN

## 2022-02-08 DIAGNOSIS — J02.9 ACUTE PHARYNGITIS, UNSPECIFIED ETIOLOGY: ICD-10-CM

## 2022-02-08 DIAGNOSIS — Z20.822 SUSPECTED COVID-19 VIRUS INFECTION: ICD-10-CM

## 2022-02-08 DIAGNOSIS — J02.9 ACUTE PHARYNGITIS, UNSPECIFIED ETIOLOGY: Primary | ICD-10-CM

## 2022-02-08 PROCEDURE — 99213 OFFICE O/P EST LOW 20 MIN: CPT | Performed by: NURSE PRACTITIONER

## 2022-02-08 NOTE — PROGRESS NOTES
"Subjective   Chief Complaint:  Throat pain    History of Present Illness:  This 51 y.o. female was seen in the office today.  She reports a scratchy throat with some pain x2 days.  Reports has body aches but works out and is contributing this to that.  Reports occasional headache.    Allergies   Allergen Reactions   • Penicillins Anxiety      Current Outpatient Medications on File Prior to Visit   Medication Sig   • lisinopril (PRINIVIL,ZESTRIL) 10 MG tablet TAKE ONE TABLET DAILY GENERIC FOR ZESTRIL   • meloxicam (Mobic) 15 MG tablet Take 1 tablet by mouth Daily.   • omega-3 acid ethyl esters (Lovaza) 1 g capsule Take 2 capsules by mouth 2 (Two) Times a Day.   • sertraline (ZOLOFT) 25 MG tablet TAKE ONE TABLET DAILY GENERIC FOR ZOLOFT     No current facility-administered medications on file prior to visit.      Past Medical, Surgical, Social, and Family History:  Past Medical History:   Diagnosis Date   • Depression    • Vertigo      History reviewed. No pertinent surgical history.  Social History     Socioeconomic History   • Marital status:    Tobacco Use   • Smoking status: Never Smoker   Substance and Sexual Activity   • Alcohol use: No   • Drug use: No     History reviewed. No pertinent family history.  Objective   Physical Exam  Constitutional:       General: She is not in acute distress.     Appearance: She is ill-appearing. She is not toxic-appearing or diaphoretic.   HENT:      Mouth/Throat:      Pharynx: Posterior oropharyngeal erythema present. No oropharyngeal exudate.   Pulmonary:      Effort: Pulmonary effort is normal. No respiratory distress.   Neurological:      Mental Status: She is alert.     /80   Pulse 75   Temp 98.3 °F (36.8 °C)   Resp 16   Ht 160 cm (63\")   Wt 68.9 kg (152 lb)   SpO2 98%   BMI 26.93 kg/m²     Assessment/Plan   Diagnoses and all orders for this visit:    1. Acute pharyngitis, unspecified etiology (Primary)  -     Rapid Strep A Screen - Swab, Throat; " Future    2. Suspected COVID-19 virus infection  -     COVID-19,[facility],NP/OP Swab in Transport Media or ESwab 72 HR TAT - Swab, Nasopharynx; Future    Discussion:  Advised and educated plan of care.      We will proceed with Covid and strep testing.    Quarantine, supportive care-sore throat likely viral whether Covid or not.  I will see how she is doing when the Covid test comes back.  No antibiotics at this time.    Patient's Body mass index is 26.93 kg/m². indicating that she is overweight (BMI 25-29.9). Patient's (Body mass index is 26.93 kg/m².) indicates that they are overweight with health conditions that include hypertension and dyslipidemias . Weight is unchanged. BMI is is above average; BMI management plan is completed. We discussed portion control and increasing exercise. .    Follow-up:  Return for As Needed - Depending on Test Results - Will Call.    Electronically signed by VALERIA Ricks, 02/08/22, 1:33 PM CST.

## 2022-02-09 DIAGNOSIS — K31.89 HYPERACIDITY: Primary | ICD-10-CM

## 2022-02-09 DIAGNOSIS — J02.9 ACUTE PHARYNGITIS, UNSPECIFIED ETIOLOGY: ICD-10-CM

## 2022-02-09 RX ORDER — AZITHROMYCIN 250 MG/1
TABLET, FILM COATED ORAL
Qty: 6 TABLET | Refills: 0 | Status: SHIPPED | OUTPATIENT
Start: 2022-02-09 | End: 2022-02-14

## 2022-02-09 RX ORDER — OMEPRAZOLE 20 MG/1
20 CAPSULE, DELAYED RELEASE ORAL DAILY
Qty: 14 CAPSULE | Refills: 0 | Status: SHIPPED | OUTPATIENT
Start: 2022-02-09

## 2022-02-09 NOTE — PROGRESS NOTES
Notified patient results-throat is still sore, reports a flareup of acid reflux.  Requesting medicine for this.  Rx Z-Harjinder and Prilosec sent to [facility]. Electronically signed by VALERIA Ricks, 02/09/22, 4:14 PM CST.

## 2022-03-18 ENCOUNTER — OFFICE VISIT (OUTPATIENT)
Dept: FAMILY MEDICINE CLINIC | Facility: CLINIC | Age: 52
End: 2022-03-18

## 2022-03-18 VITALS
WEIGHT: 150 LBS | BODY MASS INDEX: 23.54 KG/M2 | RESPIRATION RATE: 20 BRPM | OXYGEN SATURATION: 98 % | TEMPERATURE: 97.8 F | SYSTOLIC BLOOD PRESSURE: 128 MMHG | HEIGHT: 67 IN | HEART RATE: 106 BPM | DIASTOLIC BLOOD PRESSURE: 80 MMHG

## 2022-03-18 DIAGNOSIS — R11.0 NAUSEA: Primary | ICD-10-CM

## 2022-03-18 PROCEDURE — 99213 OFFICE O/P EST LOW 20 MIN: CPT | Performed by: FAMILY MEDICINE

## 2022-03-18 RX ORDER — ONDANSETRON 4 MG/1
4 TABLET, ORALLY DISINTEGRATING ORAL EVERY 8 HOURS PRN
Qty: 6 TABLET | Refills: 0 | Status: SHIPPED | OUTPATIENT
Start: 2022-03-18

## 2022-03-18 NOTE — PROGRESS NOTES
"Subjective   Haley Jewell is a 51 y.o. female.     Chief Complaint   Patient presents with   • Nausea   • Vomiting     Pt stated she started throwing up last night        History of Present Illness     she has been having severe nausea and abd pain since last night when she ate some cheese--denies diarhea      Current Outpatient Medications:   •  lisinopril (PRINIVIL,ZESTRIL) 10 MG tablet, TAKE ONE TABLET DAILY GENERIC FOR ZESTRIL, Disp: 30 tablet, Rfl: 2  •  meloxicam (Mobic) 15 MG tablet, Take 1 tablet by mouth Daily., Disp: 30 tablet, Rfl: 0  •  omega-3 acid ethyl esters (Lovaza) 1 g capsule, Take 2 capsules by mouth 2 (Two) Times a Day., Disp: 120 capsule, Rfl: 5  •  omeprazole (PrilOSEC) 20 MG capsule, Take 1 capsule by mouth Daily., Disp: 14 capsule, Rfl: 0  •  sertraline (ZOLOFT) 25 MG tablet, TAKE ONE TABLET DAILY GENERIC FOR ZOLOFT, Disp: 30 tablet, Rfl: 2  •  ondansetron ODT (Zofran ODT) 4 MG disintegrating tablet, Place 1 tablet on the tongue Every 8 (Eight) Hours As Needed for Nausea or Vomiting., Disp: 6 tablet, Rfl: 0  Allergies   Allergen Reactions   • Penicillins Anxiety       Patient's Body mass index is 23.49 kg/m². indicating that she is within normal range (BMI 18.5-24.9). No BMI management plan needed..      Past Medical History:   Diagnosis Date   • Depression    • Vertigo      History reviewed. No pertinent surgical history.    Review of Systems   Constitutional: Negative.    Gastrointestinal: Positive for abdominal pain, nausea and vomiting. Negative for blood in stool and diarrhea.       Objective  /80 (BP Location: Left arm, Patient Position: Sitting, Cuff Size: Adult)   Pulse 106   Temp 97.8 °F (36.6 °C) (Infrared)   Resp 20   Ht 170.2 cm (67\") Comment: pt reported.  Wt 68 kg (150 lb)   SpO2 98%   Breastfeeding No   BMI 23.49 kg/m²   Physical Exam  Vitals and nursing note reviewed.   Constitutional:       Appearance: Normal appearance.   HENT:      Head: Normocephalic.    "   Nose: Nose normal.   Cardiovascular:      Rate and Rhythm: Normal rate and regular rhythm.      Pulses: Normal pulses.      Heart sounds: Normal heart sounds.   Pulmonary:      Effort: Pulmonary effort is normal.      Breath sounds: Normal breath sounds.   Abdominal:      General: Abdomen is flat. Bowel sounds are normal. There is no distension.      Tenderness: There is abdominal tenderness. There is no guarding or rebound.      Hernia: No hernia is present.   Neurological:      Mental Status: She is alert.         Assessment/Plan   Diagnoses and all orders for this visit:    1. Nausea (Primary)    Other orders  -     ondansetron ODT (Zofran ODT) 4 MG disintegrating tablet; Place 1 tablet on the tongue Every 8 (Eight) Hours As Needed for Nausea or Vomiting.  Dispense: 6 tablet; Refill: 0    I told her that her symtoms are suggested of gb dis and I  Suggested er eval and she agrees--her daughter is taking her to the er now           No orders of the defined types were placed in this encounter.      Follow up: prn

## 2022-03-30 DIAGNOSIS — I10 ESSENTIAL HYPERTENSION: ICD-10-CM

## 2022-03-30 RX ORDER — LISINOPRIL 10 MG/1
TABLET ORAL
Qty: 30 TABLET | Refills: 2 | Status: SHIPPED | OUTPATIENT
Start: 2022-03-30 | End: 2022-07-14 | Stop reason: SDUPTHER

## 2022-03-30 NOTE — TELEPHONE ENCOUNTER
Rx Refill Note  Requested Prescriptions     Pending Prescriptions Disp Refills   • lisinopril (PRINIVIL,ZESTRIL) 10 MG tablet [Pharmacy Med Name: LISINOPRIL 10MG TABLET] 30 tablet 2     Sig: TAKE ONE TABLET DAILY GENERIC FOR ZESTRIL      Last office visit with prescribing clinician: 2/8/2022      Next office visit with prescribing clinician: Visit date not found            Kristen Francis MA  03/30/22, 09:26 CDT

## 2022-06-24 DIAGNOSIS — F33.1 MODERATE EPISODE OF RECURRENT MAJOR DEPRESSIVE DISORDER: ICD-10-CM

## 2022-06-27 RX ORDER — SERTRALINE HYDROCHLORIDE 25 MG/1
TABLET, FILM COATED ORAL
Qty: 30 TABLET | Refills: 2 | Status: SHIPPED | OUTPATIENT
Start: 2022-06-27 | End: 2022-09-30

## 2022-07-14 ENCOUNTER — OFFICE VISIT (OUTPATIENT)
Dept: FAMILY MEDICINE CLINIC | Facility: CLINIC | Age: 52
End: 2022-07-14

## 2022-07-14 VITALS
HEART RATE: 64 BPM | WEIGHT: 154 LBS | BODY MASS INDEX: 24.17 KG/M2 | HEIGHT: 67 IN | DIASTOLIC BLOOD PRESSURE: 70 MMHG | SYSTOLIC BLOOD PRESSURE: 132 MMHG | TEMPERATURE: 98.5 F | RESPIRATION RATE: 16 BRPM

## 2022-07-14 DIAGNOSIS — I10 ESSENTIAL HYPERTENSION: ICD-10-CM

## 2022-07-14 DIAGNOSIS — Z12.11 SCREEN FOR COLON CANCER: ICD-10-CM

## 2022-07-14 DIAGNOSIS — I10 PRIMARY HYPERTENSION: ICD-10-CM

## 2022-07-14 DIAGNOSIS — F32.89 OTHER DEPRESSION: Primary | ICD-10-CM

## 2022-07-14 PROCEDURE — 99213 OFFICE O/P EST LOW 20 MIN: CPT | Performed by: FAMILY MEDICINE

## 2022-07-14 RX ORDER — LISINOPRIL 10 MG/1
10 TABLET ORAL DAILY
Qty: 30 TABLET | Refills: 5 | Status: SHIPPED | OUTPATIENT
Start: 2022-07-14 | End: 2022-07-19

## 2022-07-14 NOTE — PROGRESS NOTES
"Subjective   Haley Jewell is a 51 y.o. female.     No chief complaint on file.  CC:\"im here for recheck on my meds\"    History of Present Illness     she is noting good bp contorl without cp or ha---she is tolerating zoloft fo her depresson--denies being suicidial      Current Outpatient Medications:   •  lisinopril (PRINIVIL,ZESTRIL) 10 MG tablet, Take 1 tablet by mouth Daily., Disp: 30 tablet, Rfl: 5  •  omega-3 acid ethyl esters (Lovaza) 1 g capsule, Take 2 capsules by mouth 2 (Two) Times a Day., Disp: 120 capsule, Rfl: 5  •  sertraline (ZOLOFT) 25 MG tablet, TAKE ONE TABLET DAILY GENERIC FOR ZOLOFT, Disp: 30 tablet, Rfl: 2  •  meloxicam (Mobic) 15 MG tablet, Take 1 tablet by mouth Daily., Disp: 30 tablet, Rfl: 0  •  omeprazole (PrilOSEC) 20 MG capsule, Take 1 capsule by mouth Daily., Disp: 14 capsule, Rfl: 0  •  ondansetron ODT (Zofran ODT) 4 MG disintegrating tablet, Place 1 tablet on the tongue Every 8 (Eight) Hours As Needed for Nausea or Vomiting., Disp: 6 tablet, Rfl: 0  Allergies   Allergen Reactions   • Penicillins Anxiety       BMI is within normal parameters. No other follow-up for BMI required.      Past Medical History:   Diagnosis Date   • Depression    • Vertigo      No past surgical history on file.    Review of Systems   Constitutional: Negative.    HENT: Negative.    Eyes: Negative.    Respiratory: Negative.    Cardiovascular: Negative.    Gastrointestinal: Negative.    Endocrine: Negative.    Genitourinary: Negative.    Musculoskeletal: Negative.    Skin: Negative.    Allergic/Immunologic: Negative.    Neurological: Negative.    Hematological: Negative.    Psychiatric/Behavioral: Negative.        Objective  /70   Pulse 64   Temp 98.5 °F (36.9 °C)   Resp 16   Ht 170.2 cm (67.01\")   Wt 69.9 kg (154 lb)   BMI 24.11 kg/m²   Physical Exam  Vitals and nursing note reviewed.   Constitutional:       Appearance: Normal appearance. She is normal weight.   HENT:      Head: Normocephalic.    "   Nose: Nose normal.      Mouth/Throat:      Mouth: Mucous membranes are moist.   Eyes:      Extraocular Movements: Extraocular movements intact.      Conjunctiva/sclera: Conjunctivae normal.      Pupils: Pupils are equal, round, and reactive to light.   Cardiovascular:      Rate and Rhythm: Normal rate and regular rhythm.      Pulses: Normal pulses.      Heart sounds: Normal heart sounds.   Pulmonary:      Effort: Pulmonary effort is normal.      Breath sounds: Normal breath sounds.   Abdominal:      General: Abdomen is flat. Bowel sounds are normal.      Palpations: Abdomen is soft.   Musculoskeletal:         General: Normal range of motion.      Cervical back: Normal range of motion and neck supple.   Skin:     General: Skin is warm.      Capillary Refill: Capillary refill takes less than 2 seconds.   Neurological:      General: No focal deficit present.      Mental Status: She is alert and oriented to person, place, and time. Mental status is at baseline.   Psychiatric:         Mood and Affect: Mood normal.         Assessment & Plan   Diagnoses and all orders for this visit:    1. Other depression (Primary)    2. Primary hypertension    3. Screen for colon cancer  -     Ambulatory Referral to Gastroenterology    4. Essential hypertension  -     lisinopril (PRINIVIL,ZESTRIL) 10 MG tablet; Take 1 tablet by mouth Daily.  Dispense: 30 tablet; Refill: 5        She will mojnitor bp andkeep us infojrd.  She will let us know if any changes in emotional state.         Orders Placed This Encounter   Procedures   • Ambulatory Referral to Gastroenterology     Referral Priority:   Routine     Referral Type:   Consultation     Referral Reason:   Specialty Services Required     Requested Specialty:   Gastroenterology     Number of Visits Requested:   1       Follow up: 6 month(s)

## 2022-07-19 DIAGNOSIS — I10 ESSENTIAL HYPERTENSION: ICD-10-CM

## 2022-07-19 RX ORDER — LISINOPRIL 10 MG/1
TABLET ORAL
Qty: 30 TABLET | Refills: 2 | Status: SHIPPED | OUTPATIENT
Start: 2022-07-19 | End: 2023-02-22

## 2022-09-30 DIAGNOSIS — F33.1 MODERATE EPISODE OF RECURRENT MAJOR DEPRESSIVE DISORDER: ICD-10-CM

## 2022-09-30 RX ORDER — SERTRALINE HYDROCHLORIDE 25 MG/1
TABLET, FILM COATED ORAL
Qty: 30 TABLET | Refills: 2 | Status: SHIPPED | OUTPATIENT
Start: 2022-09-30 | End: 2022-10-17 | Stop reason: SDUPTHER

## 2022-10-17 DIAGNOSIS — F33.1 MODERATE EPISODE OF RECURRENT MAJOR DEPRESSIVE DISORDER: ICD-10-CM

## 2022-10-17 RX ORDER — SERTRALINE HYDROCHLORIDE 25 MG/1
25 TABLET, FILM COATED ORAL DAILY
Qty: 30 TABLET | Refills: 2 | Status: SHIPPED | OUTPATIENT
Start: 2022-10-17 | End: 2022-10-19 | Stop reason: SDUPTHER

## 2022-10-19 ENCOUNTER — OFFICE VISIT (OUTPATIENT)
Dept: FAMILY MEDICINE CLINIC | Facility: CLINIC | Age: 52
End: 2022-10-19

## 2022-10-19 VITALS — BODY MASS INDEX: 23.86 KG/M2 | WEIGHT: 152 LBS | HEIGHT: 67 IN | HEART RATE: 77 BPM | OXYGEN SATURATION: 96 %

## 2022-10-19 DIAGNOSIS — J02.0 PHARYNGITIS DUE TO STREPTOCOCCUS SPECIES: Primary | ICD-10-CM

## 2022-10-19 DIAGNOSIS — F33.1 MODERATE EPISODE OF RECURRENT MAJOR DEPRESSIVE DISORDER: ICD-10-CM

## 2022-10-19 PROCEDURE — 99213 OFFICE O/P EST LOW 20 MIN: CPT | Performed by: FAMILY MEDICINE

## 2022-10-19 RX ORDER — AZITHROMYCIN 250 MG/1
TABLET, FILM COATED ORAL
Qty: 6 TABLET | Refills: 0 | Status: SHIPPED | OUTPATIENT
Start: 2022-10-19 | End: 2023-03-16

## 2022-10-19 RX ORDER — SERTRALINE HYDROCHLORIDE 25 MG/1
25 TABLET, FILM COATED ORAL DAILY
Qty: 30 TABLET | Refills: 2 | Status: SHIPPED | OUTPATIENT
Start: 2022-10-19

## 2022-10-19 NOTE — PROGRESS NOTES
"Subjective   Haley Jewell is a 51 y.o. female.     Chief Complaint   Patient presents with   • Nasal Congestion   • Sore Throat        History of Present Illness     she notes hving sore throat wth fever--her daugther has been ill with strep throat      Current Outpatient Medications:   •  lisinopril (PRINIVIL,ZESTRIL) 10 MG tablet, TAKE ONE TABLET DAILY GENERIC FOR ZESTRIL, Disp: 30 tablet, Rfl: 2  •  omega-3 acid ethyl esters (Lovaza) 1 g capsule, Take 2 capsules by mouth 2 (Two) Times a Day., Disp: 120 capsule, Rfl: 5  •  azithromycin (Zithromax Z-Harjinder) 250 MG tablet, Take 2 tablets by mouth on day 1, then 1 tablet daily on days 2-5, Disp: 6 tablet, Rfl: 0  •  meloxicam (Mobic) 15 MG tablet, Take 1 tablet by mouth Daily., Disp: 30 tablet, Rfl: 0  •  omeprazole (PrilOSEC) 20 MG capsule, Take 1 capsule by mouth Daily., Disp: 14 capsule, Rfl: 0  •  ondansetron ODT (Zofran ODT) 4 MG disintegrating tablet, Place 1 tablet on the tongue Every 8 (Eight) Hours As Needed for Nausea or Vomiting., Disp: 6 tablet, Rfl: 0  •  sertraline (ZOLOFT) 25 MG tablet, Take 1 tablet by mouth Daily., Disp: 30 tablet, Rfl: 2  Allergies   Allergen Reactions   • Penicillins Anxiety       BMI is within normal parameters. No other follow-up for BMI required.      Past Medical History:   Diagnosis Date   • Depression    • Vertigo      No past surgical history on file.    Review of Systems   Constitutional: Negative.    HENT: Positive for congestion and sore throat.    Eyes: Negative.    Respiratory: Negative.    Cardiovascular: Negative.    Gastrointestinal: Negative.    Endocrine: Negative.    Genitourinary: Negative.    Musculoskeletal: Negative.    Skin: Negative.    Allergic/Immunologic: Negative.    Neurological: Negative.    Hematological: Negative.    Psychiatric/Behavioral: Negative.        Objective  Pulse 77   Ht 170.2 cm (67.01\")   Wt 68.9 kg (152 lb)   SpO2 96%   BMI 23.80 kg/m²   Physical Exam  Vitals and nursing note " reviewed.   Constitutional:       Appearance: Normal appearance. She is normal weight.   HENT:      Head: Normocephalic and atraumatic.      Nose: Nose normal.      Mouth/Throat:      Mouth: Mucous membranes are moist.      Pharynx: Oropharyngeal exudate and posterior oropharyngeal erythema present.   Eyes:      Extraocular Movements: Extraocular movements intact.      Conjunctiva/sclera: Conjunctivae normal.      Pupils: Pupils are equal, round, and reactive to light.   Cardiovascular:      Rate and Rhythm: Normal rate and regular rhythm.      Pulses: Normal pulses.      Heart sounds: Normal heart sounds.   Pulmonary:      Effort: Pulmonary effort is normal.      Breath sounds: Normal breath sounds.   Abdominal:      General: Abdomen is flat. Bowel sounds are normal.      Palpations: Abdomen is soft.   Musculoskeletal:         General: Normal range of motion.      Cervical back: Normal range of motion and neck supple.   Skin:     General: Skin is warm and dry.      Capillary Refill: Capillary refill takes less than 2 seconds.   Neurological:      General: No focal deficit present.      Mental Status: She is alert and oriented to person, place, and time. Mental status is at baseline.   Psychiatric:         Mood and Affect: Mood normal.         Assessment & Plan   Diagnoses and all orders for this visit:    1. Pharyngitis due to Streptococcus species (Primary)    Other orders  -     azithromycin (Zithromax Z-Harjinder) 250 MG tablet; Take 2 tablets by mouth on day 1, then 1 tablet daily on days 2-5  Dispense: 6 tablet; Refill: 0      Salt water gargles--keep me informd           No orders of the defined types were placed in this encounter.      Follow up: prn

## 2022-12-13 RX ORDER — OMEGA-3-ACID ETHYL ESTERS 1 G/1
2 CAPSULE, LIQUID FILLED ORAL 2 TIMES DAILY
Qty: 120 CAPSULE | Refills: 5 | Status: SHIPPED | OUTPATIENT
Start: 2022-12-13

## 2022-12-13 NOTE — TELEPHONE ENCOUNTER
Rx Refill Note  Requested Prescriptions     Pending Prescriptions Disp Refills   • omega-3 acid ethyl esters (LOVAZA) 1 g capsule [Pharmacy Med Name: OMEGA-3-ACID ETHYL ESTERS 1GM CAPSULE] 120 capsule 5     Sig: TAKE 2 CAPSULES BY MOUTH 2 (TWO) TIMES A DAY.      Last office visit with prescribing clinician: 2/8/2022      Next office visit with prescribing clinician: Visit date not found            Alexis Sequeira MA  12/13/22, 14:38 CST

## 2023-01-31 NOTE — MAMMO
EXAM:  Digital screening mammogram with Tomosynthesis 

  

HISTORY:  Screening 

  

COMPARISON:  06/15/2016 

  

FINDINGS:  Digital  MLO and CC views of the right and left breast were performed.  Tomosynthesis was 
performed.  Computer aided detection utilized. There are scattered fibroglandular densities.  There i
s no evidence for mass, asymmetry, distortion, or suspicious calcifications in either breast. 

  

IMPRESSION: 

1.  No evidence of malignancy in the right or left breast. 

2.  Annual screening mammogram is recommended in one year. 

  

BIRADS category 1, negative examination Patient tolerate TD vaccines injection well without any complication.

## 2023-02-22 DIAGNOSIS — I10 ESSENTIAL HYPERTENSION: ICD-10-CM

## 2023-02-22 RX ORDER — LISINOPRIL 10 MG/1
TABLET ORAL
Qty: 30 TABLET | Refills: 5 | Status: SHIPPED | OUTPATIENT
Start: 2023-02-22

## 2023-03-03 ENCOUNTER — TELEPHONE (OUTPATIENT)
Dept: FAMILY MEDICINE CLINIC | Facility: CLINIC | Age: 53
End: 2023-03-03

## 2023-03-03 NOTE — TELEPHONE ENCOUNTER
Caller: Alfredo Jewell    Relationship: Emergency Contact    Best call back number:  [phone]     What medication are you requesting: provider preference     What are your current symptoms: nausea and vomiting; had some painful urination started this morning     How long have you been experiencing symptoms: last night nausea and vomiting started     If a prescription is needed, what is your preferred pharmacy and phone number: [facility] - [place], [place] - [address] [facility] [phone] [facility] [phone] FX     Additional notes: has tried OTC nausea meds; have not been helpful -  said to please call him, Haley is home resting.

## 2023-03-03 NOTE — TELEPHONE ENCOUNTER
"There is a \"stomach bug\" going areound but it should not cause painful urination--I would suggest er for fluids and to check the urine  "

## 2023-03-16 ENCOUNTER — OFFICE VISIT (OUTPATIENT)
Dept: FAMILY MEDICINE CLINIC | Facility: CLINIC | Age: 53
End: 2023-03-16
Payer: MEDICAID

## 2023-03-16 VITALS
WEIGHT: 151.4 LBS | BODY MASS INDEX: 23.76 KG/M2 | OXYGEN SATURATION: 98 % | RESPIRATION RATE: 18 BRPM | TEMPERATURE: 97.1 F | SYSTOLIC BLOOD PRESSURE: 126 MMHG | HEIGHT: 67 IN | DIASTOLIC BLOOD PRESSURE: 82 MMHG | HEART RATE: 78 BPM

## 2023-03-16 DIAGNOSIS — J02.9 SORE THROAT: Primary | ICD-10-CM

## 2023-03-16 DIAGNOSIS — J02.9 ACUTE PHARYNGITIS, UNSPECIFIED ETIOLOGY: ICD-10-CM

## 2023-03-16 LAB
EXPIRATION DATE: NORMAL
EXPIRATION DATE: NORMAL
INTERNAL CONTROL: NORMAL
INTERNAL CONTROL: NORMAL
Lab: NORMAL
Lab: NORMAL
S PYO AG THROAT QL: NEGATIVE
SARS-COV-2 AG UPPER RESP QL IA.RAPID: NOT DETECTED

## 2023-03-16 PROCEDURE — 87880 STREP A ASSAY W/OPTIC: CPT | Performed by: NURSE PRACTITIONER

## 2023-03-16 PROCEDURE — 99213 OFFICE O/P EST LOW 20 MIN: CPT | Performed by: NURSE PRACTITIONER

## 2023-03-16 PROCEDURE — 87426 SARSCOV CORONAVIRUS AG IA: CPT | Performed by: NURSE PRACTITIONER

## 2023-03-16 RX ORDER — AZITHROMYCIN 250 MG/1
TABLET, FILM COATED ORAL
Qty: 6 TABLET | Refills: 0 | Status: SHIPPED | OUTPATIENT
Start: 2023-03-16 | End: 2023-03-21

## 2023-03-16 RX ORDER — METHYLPREDNISOLONE 4 MG/1
TABLET ORAL
Qty: 1 EACH | Refills: 0 | Status: SHIPPED | OUTPATIENT
Start: 2023-03-16

## 2023-03-16 NOTE — PROGRESS NOTES
"Subjective   Chief Complaint:  Sore throat.    History of Present Illness:  This 52 y.o. female was seen in the office today.      The patient presents today with complaints of a sore throat that began yesterday, 03/15/2023. She denies ear pain, sinus pain or pressure, or pressure behind her forehead, nose, or face. She reports tenderness in her neck. She notes cough.    The patient reports she was in the hospital 2 weeks ago and \"they threw me a lot of blood\". She states she completed the medication and is feeling better. She declined to take a mono lab test. For the last 2 days, she went to the gym and was sweaty. The patient has gone outside without a sweater.    The patient does not work today.    Allergies   Allergen Reactions   • Penicillins Anxiety      Current Outpatient Medications on File Prior to Visit   Medication Sig   • lisinopril (PRINIVIL,ZESTRIL) 10 MG tablet TAKE 1 TABLET BY MOUTH DAILY.   • meloxicam (Mobic) 15 MG tablet Take 1 tablet by mouth Daily.   • omega-3 acid ethyl esters (LOVAZA) 1 g capsule TAKE 2 CAPSULES BY MOUTH 2 (TWO) TIMES A DAY.   • omeprazole (PrilOSEC) 20 MG capsule Take 1 capsule by mouth Daily.   • ondansetron ODT (Zofran ODT) 4 MG disintegrating tablet Place 1 tablet on the tongue Every 8 (Eight) Hours As Needed for Nausea or Vomiting.   • sertraline (ZOLOFT) 25 MG tablet Take 1 tablet by mouth Daily.     No current facility-administered medications on file prior to visit.      Past Medical, Surgical, Social, and Family History:  Past Medical History:   Diagnosis Date   • Depression    • Vertigo      No past surgical history on file.  Social History     Socioeconomic History   • Marital status:    Tobacco Use   • Smoking status: Never   • Smokeless tobacco: Never   Substance and Sexual Activity   • Alcohol use: No   • Drug use: No     No family history on file.    Objective   Physical Exam  Vitals and nursing note reviewed.   Constitutional:       General: She is not " "in acute distress.     Appearance: Normal appearance. She is not toxic-appearing.   HENT:      Mouth/Throat:      Pharynx: Posterior oropharyngeal erythema present.      Comments: Palpable anterior adenopathy, bilateral. Throat red, erythematous with white patches.  Cardiovascular:      Rate and Rhythm: Normal rate and regular rhythm.      Pulses: Normal pulses.      Heart sounds: Normal heart sounds.   Pulmonary:      Effort: Pulmonary effort is normal.      Breath sounds: Normal breath sounds.   Skin:     General: Skin is warm and dry.      Findings: No rash.   Neurological:      Mental Status: She is alert.     /82 (BP Location: Left arm, Patient Position: Sitting, Cuff Size: Adult)   Pulse 78   Temp 97.1 °F (36.2 °C) (Infrared)   Resp 18   Ht 170.2 cm (67.01\")   Wt 68.7 kg (151 lb 6.4 oz)   SpO2 98%   BMI 23.71 kg/m²     Prior Visit Notes/Records, Lab, Imaging, and Diagnostic Results Reviewed:  CBC:No results for input(s): WBC, HGB, HCT, PLT, IRONSERUM, IRON in the last 71749 hours.   Chemistry:  Lab Results - Last 18 Months   Lab Units 01/05/22  0749   SODIUM mmol/L 140   POTASSIUM mmol/L 4.3   CHLORIDE mmol/L 105   TOTAL CO2 mmol/L 28.1   GLUCOSE mg/dL 87   BUN mg/dL 11   CREATININE mg/dL 0.66   EGFR IF NONAFRICN AM mL/min/1.73 95   EGFR IF AFRICN AM mL/min/1.73 115   CALCIUM mg/dL 9.3     Lab Results - Last 18 Months   Lab Units 01/05/22  0749   ALT (SGPT) U/L 21   AST (SGOT) U/L 17   ALK PHOS U/L 84       Assessment & Plan   Diagnoses and all orders for this visit:    1. Sore throat (Primary)  -     POCT VERITOR SARS-CoV-2 Antigen  -     POCT rapid strep A    2. Acute pharyngitis, unspecified etiology  -     azithromycin (Zithromax Z-Harjinder) 250 MG tablet; Take 2 tablets the first day, then 1 tablet daily for 4 days.  Dispense: 6 tablet; Refill: 0  -     methylPREDNISolone (MEDROL) 4 MG dose pack; Take as directed on package instructions.  Dispense: 1 each; Refill: 0    Discussion:  Advised and " educated plan of care.  The patient's COVID-19 and strep test was negative. She will be prescribed azithromycin and Medrol. Advised patient to rest and drink plenty of fluids.  Advised recommending a monotest-she declined today.     BMI is within normal parameters. No other follow-up for BMI required.    Follow-up:  Return for follow-up as needed.    Transcribed from ambient dictation for VALERIA Ricks by Alexey Bell.  03/16/23   14:52 CDT    Patient or patient representative verbalized consent to the visit recording.  I have personally performed the services described in this document as transcribed by the above individual, and it is both accurate and complete.    Electronically signed by Shawn Goncalves, 03/16/23, 2:52 PM CDT.

## 2023-03-21 DIAGNOSIS — N39.0 URINARY TRACT INFECTION WITHOUT HEMATURIA, SITE UNSPECIFIED: Primary | ICD-10-CM

## 2023-03-23 ENCOUNTER — TELEPHONE (OUTPATIENT)
Dept: FAMILY MEDICINE CLINIC | Facility: CLINIC | Age: 53
End: 2023-03-23

## 2023-03-23 LAB
APPEARANCE UR: CLEAR
BACTERIA #/AREA URNS HPF: ABNORMAL /HPF
BACTERIA UR CULT: NORMAL
BACTERIA UR CULT: NORMAL
BILIRUB UR QL STRIP: NEGATIVE
CASTS URNS MICRO: ABNORMAL
COLOR UR: YELLOW
EPI CELLS #/AREA URNS HPF: ABNORMAL /HPF
GLUCOSE UR QL STRIP: NEGATIVE
HGB UR QL STRIP: NEGATIVE
KETONES UR QL STRIP: NEGATIVE
LEUKOCYTE ESTERASE UR QL STRIP: ABNORMAL
NITRITE UR QL STRIP: NEGATIVE
PH UR STRIP: 5.5 [PH] (ref 5–8)
PROT UR QL STRIP: NEGATIVE
RBC #/AREA URNS HPF: ABNORMAL /HPF
SP GR UR STRIP: 1.02 (ref 1–1.03)
UROBILINOGEN UR STRIP-MCNC: ABNORMAL MG/DL
WBC #/AREA URNS HPF: ABNORMAL /HPF

## 2023-03-23 NOTE — TELEPHONE ENCOUNTER
Caller: Stacy Jewell    Relationship: Emergency Contact    Best call back number:[phone]    Caller requesting test results: DAUGHTER     What test was performed: URINE TEST, KIDNEY INFECTION      When was the test performed: 03/21/23    Where was the test performed: OFFICE

## 2023-08-17 ENCOUNTER — OFFICE VISIT (OUTPATIENT)
Dept: FAMILY MEDICINE CLINIC | Facility: CLINIC | Age: 53
End: 2023-08-17

## 2023-08-17 VITALS
OXYGEN SATURATION: 98 % | TEMPERATURE: 97.1 F | HEIGHT: 67 IN | RESPIRATION RATE: 18 BRPM | DIASTOLIC BLOOD PRESSURE: 84 MMHG | BODY MASS INDEX: 23.26 KG/M2 | SYSTOLIC BLOOD PRESSURE: 130 MMHG | WEIGHT: 148.2 LBS | HEART RATE: 63 BPM

## 2023-08-17 DIAGNOSIS — E53.8 VITAMIN B12 DEFICIENCY: ICD-10-CM

## 2023-08-17 DIAGNOSIS — K08.89 PAIN, DENTAL: Primary | ICD-10-CM

## 2023-08-17 DIAGNOSIS — E55.9 VITAMIN D DEFICIENCY: ICD-10-CM

## 2023-08-17 DIAGNOSIS — Z00.00 WELLNESS EXAMINATION: ICD-10-CM

## 2023-08-17 RX ORDER — NAPROXEN 500 MG/1
500 TABLET ORAL 2 TIMES DAILY WITH MEALS
Qty: 14 TABLET | Refills: 0 | Status: SHIPPED | OUTPATIENT
Start: 2023-08-17

## 2023-08-17 NOTE — PROGRESS NOTES
"Subjective   Chief Complaint:  Dental pain.    History of Present Illness:  This 52 y.o. female was seen in the office today.      The patient presents today with dental pain. She reports upper and lower gum pain and sensitivity. She reports she has already been to the dentist and was informed everything looked okay. The patient inquires if she possibly has a vitamin deficiency. She reports a past history of vitamin D and vitamin B12 deficiencies.    Allergies   Allergen Reactions    Penicillins Anxiety      Current Outpatient Medications on File Prior to Visit   Medication Sig    lisinopril (PRINIVIL,ZESTRIL) 10 MG tablet TAKE 1 TABLET BY MOUTH DAILY.    omega-3 acid ethyl esters (LOVAZA) 1 g capsule TAKE 2 CAPSULES BY MOUTH 2 (TWO) TIMES A DAY.    omeprazole (PrilOSEC) 20 MG capsule Take 1 capsule by mouth Daily.    ondansetron ODT (Zofran ODT) 4 MG disintegrating tablet Place 1 tablet on the tongue Every 8 (Eight) Hours As Needed for Nausea or Vomiting.    sertraline (ZOLOFT) 25 MG tablet TAKE 1 TABLET BY MOUTH DAILY.    [DISCONTINUED] meloxicam (Mobic) 15 MG tablet Take 1 tablet by mouth Daily.    [DISCONTINUED] methylPREDNISolone (MEDROL) 4 MG dose pack Take as directed on package instructions.     No current facility-administered medications on file prior to visit.      Past Medical, Surgical, Social, and Family History:  Past Medical History:   Diagnosis Date    Depression     Vertigo      History reviewed. No pertinent surgical history.  Social History     Socioeconomic History    Marital status:    Tobacco Use    Smoking status: Never    Smokeless tobacco: Never   Substance and Sexual Activity    Alcohol use: No    Drug use: No     History reviewed. No pertinent family history.  Objective   Vital Signs  /84 (BP Location: Left arm, Patient Position: Sitting, Cuff Size: Adult)   Pulse 63   Temp 97.1 øF (36.2 øC) (Infrared)   Resp 18   Ht 170.2 cm (67.01\")   Wt 67.2 kg (148 lb 3.2 oz)   SpO2 " 98%   BMI 23.20 kg/mý     Physical Exam  Vitals reviewed.   Constitutional:       General: She is not in acute distress.     Appearance: Normal appearance.   HENT:      Mouth/Throat:      Comments: No palpable gum abscess, upper or lower.  Cardiovascular:      Rate and Rhythm: Normal rate and regular rhythm.   Pulmonary:      Effort: Pulmonary effort is normal.      Breath sounds: Normal breath sounds.     Prior Visit Notes/Records, Lab, Imaging, and Diagnostic Results Reviewed:  CBC:No results for input(s): WBC, HGB, HCT, PLT, IRONSERUM, IRON in the last 76472 hours.   Chemistry:No results for input(s): NA, K, CL, CO2, GLUCOSE, BUN, CREATININE, EGFRIFNONA, EGFRIFAFRI, EGFRRESULT, CALCIUM in the last 25459 hours.  BMI Trend:  BMI Readings from Last 10 Encounters:   08/17/23 23.20 kg/mý   03/16/23 23.71 kg/mý   10/19/22 23.80 kg/mý   07/14/22 24.11 kg/mý   03/18/22 23.49 kg/mý   02/08/22 26.93 kg/mý   01/05/22 26.39 kg/mý   08/04/21 27.63 kg/mý   07/14/21 27.63 kg/mý   05/18/21 27.28 kg/mý     Assessment & Plan   Diagnoses and all orders for this visit:    1. Pain, dental (Primary)    2. Vitamin D deficiency  -     Vitamin D,25-Hydroxy    3. Wellness examination  -     CBC & Differential  -     Comprehensive Metabolic Panel  -     Lipid Panel  -     TSH    4. Vitamin B12 deficiency  -     Vitamin B12 & Folate    Other orders  -     naproxen (NAPROSYN) 500 MG tablet; Take 1 tablet by mouth 2 (Two) Times a Day With Meals.  Dispense: 14 tablet; Refill: 0    Discussion:  Advised and educated plan of care. Advised anti-inflammatories to follow. She will receive updated laboratory studies today.    Breast/colon declined    Follow-up:  Return for follow-up as needed pending results - we will call.    Transcribed from ambient dictation for VALERIA Ricks by Viviane Watson.  08/17/23   10:08 CDT    I have personally performed the services described in this document as transcribed by the above individual, and it is  both accurate and complete.    Electronically signed by Shawn Goncalves, 08/17/23, 10:08 AM CDT.

## 2023-08-18 LAB
25(OH)D3+25(OH)D2 SERPL-MCNC: 31.8 NG/ML (ref 30–100)
ALBUMIN SERPL-MCNC: 4.3 G/DL (ref 3.5–5.2)
ALBUMIN/GLOB SERPL: 1.7 G/DL
ALP SERPL-CCNC: 74 U/L (ref 39–117)
ALT SERPL-CCNC: 24 U/L (ref 1–33)
AST SERPL-CCNC: 20 U/L (ref 1–32)
BASOPHILS # BLD AUTO: 0.05 10*3/MM3 (ref 0–0.2)
BASOPHILS NFR BLD AUTO: 0.8 % (ref 0–1.5)
BILIRUB SERPL-MCNC: 0.3 MG/DL (ref 0–1.2)
BUN SERPL-MCNC: 12 MG/DL (ref 6–20)
BUN/CREAT SERPL: 17.1 (ref 7–25)
CALCIUM SERPL-MCNC: 9.4 MG/DL (ref 8.6–10.5)
CHLORIDE SERPL-SCNC: 103 MMOL/L (ref 98–107)
CHOLEST SERPL-MCNC: 193 MG/DL (ref 0–200)
CO2 SERPL-SCNC: 25.7 MMOL/L (ref 22–29)
CREAT SERPL-MCNC: 0.7 MG/DL (ref 0.57–1)
EGFRCR SERPLBLD CKD-EPI 2021: 105.5 ML/MIN/1.73
EOSINOPHIL # BLD AUTO: 0.16 10*3/MM3 (ref 0–0.4)
EOSINOPHIL NFR BLD AUTO: 2.5 % (ref 0.3–6.2)
ERYTHROCYTE [DISTWIDTH] IN BLOOD BY AUTOMATED COUNT: 13.3 % (ref 12.3–15.4)
FOLATE SERPL-MCNC: 17.2 NG/ML (ref 4.78–24.2)
GLOBULIN SER CALC-MCNC: 2.6 GM/DL
GLUCOSE SERPL-MCNC: 87 MG/DL (ref 65–99)
HCT VFR BLD AUTO: 39.3 % (ref 34–46.6)
HDLC SERPL-MCNC: 35 MG/DL (ref 40–60)
HGB BLD-MCNC: 13 G/DL (ref 12–15.9)
IMM GRANULOCYTES # BLD AUTO: 0.01 10*3/MM3 (ref 0–0.05)
IMM GRANULOCYTES NFR BLD AUTO: 0.2 % (ref 0–0.5)
LDLC SERPL CALC-MCNC: 121 MG/DL (ref 0–100)
LYMPHOCYTES # BLD AUTO: 2.33 10*3/MM3 (ref 0.7–3.1)
LYMPHOCYTES NFR BLD AUTO: 36.5 % (ref 19.6–45.3)
MCH RBC QN AUTO: 28.7 PG (ref 26.6–33)
MCHC RBC AUTO-ENTMCNC: 33.1 G/DL (ref 31.5–35.7)
MCV RBC AUTO: 86.8 FL (ref 79–97)
MONOCYTES # BLD AUTO: 0.51 10*3/MM3 (ref 0.1–0.9)
MONOCYTES NFR BLD AUTO: 8 % (ref 5–12)
NEUTROPHILS # BLD AUTO: 3.33 10*3/MM3 (ref 1.7–7)
NEUTROPHILS NFR BLD AUTO: 52 % (ref 42.7–76)
NRBC BLD AUTO-RTO: 0 /100 WBC (ref 0–0.2)
PLATELET # BLD AUTO: 207 10*3/MM3 (ref 140–450)
POTASSIUM SERPL-SCNC: 4.4 MMOL/L (ref 3.5–5.2)
PROT SERPL-MCNC: 6.9 G/DL (ref 6–8.5)
RBC # BLD AUTO: 4.53 10*6/MM3 (ref 3.77–5.28)
SODIUM SERPL-SCNC: 139 MMOL/L (ref 136–145)
TRIGL SERPL-MCNC: 206 MG/DL (ref 0–150)
TSH SERPL DL<=0.005 MIU/L-ACNC: 1.31 UIU/ML (ref 0.27–4.2)
VIT B12 SERPL-MCNC: 758 PG/ML (ref 211–946)
VLDLC SERPL CALC-MCNC: 37 MG/DL (ref 5–40)
WBC # BLD AUTO: 6.39 10*3/MM3 (ref 3.4–10.8)

## 2023-08-18 NOTE — PROGRESS NOTES
Please call result - Labs are stable.  Total cholesterol is better,     Electronically signed by VALERIA Ricks, 08/18/23, 8:50 AM CDT.

## 2023-08-25 ENCOUNTER — OFFICE VISIT (OUTPATIENT)
Dept: FAMILY MEDICINE CLINIC | Facility: CLINIC | Age: 53
End: 2023-08-25

## 2023-08-25 VITALS
RESPIRATION RATE: 18 BRPM | HEIGHT: 67 IN | BODY MASS INDEX: 23.23 KG/M2 | OXYGEN SATURATION: 98 % | SYSTOLIC BLOOD PRESSURE: 122 MMHG | WEIGHT: 148 LBS | TEMPERATURE: 96.9 F | DIASTOLIC BLOOD PRESSURE: 82 MMHG | HEART RATE: 84 BPM

## 2023-08-25 DIAGNOSIS — U07.1 COVID-19: ICD-10-CM

## 2023-08-25 DIAGNOSIS — R05.1 ACUTE COUGH: ICD-10-CM

## 2023-08-25 DIAGNOSIS — R52 BODY ACHES: Primary | ICD-10-CM

## 2023-08-25 LAB
EXPIRATION DATE: ABNORMAL
INTERNAL CONTROL: ABNORMAL
Lab: ABNORMAL
SARS-COV-2 AG UPPER RESP QL IA.RAPID: DETECTED

## 2023-08-25 PROCEDURE — 87426 SARSCOV CORONAVIRUS AG IA: CPT | Performed by: NURSE PRACTITIONER

## 2023-08-25 PROCEDURE — 99213 OFFICE O/P EST LOW 20 MIN: CPT | Performed by: NURSE PRACTITIONER

## 2023-08-25 RX ORDER — AZITHROMYCIN 250 MG/1
TABLET, FILM COATED ORAL
Qty: 6 TABLET | Refills: 0 | Status: SHIPPED | OUTPATIENT
Start: 2023-08-25 | End: 2023-08-30

## 2023-08-25 RX ORDER — ONDANSETRON 4 MG/1
4 TABLET, FILM COATED ORAL EVERY 8 HOURS PRN
Qty: 12 TABLET | Refills: 0 | Status: SHIPPED | OUTPATIENT
Start: 2023-08-25

## 2023-08-25 NOTE — PROGRESS NOTES
"Subjective   Chief Complaint:  Sore throat, cough, and congestion.    History of Present Illness:  This 52 y.o. female was seen in the office today.      The patient presents today with cough and nasal congestion starting Monday, 08/21/2023. Additionally, she reports experiencing nausea this week as well as sinus pain and pressure. She tested positive for COVID-19 this morning in the office.     Allergies   Allergen Reactions    Penicillins Anxiety      Current Outpatient Medications on File Prior to Visit   Medication Sig    lisinopril (PRINIVIL,ZESTRIL) 10 MG tablet TAKE 1 TABLET BY MOUTH DAILY.    naproxen (NAPROSYN) 500 MG tablet Take 1 tablet by mouth 2 (Two) Times a Day With Meals.    omega-3 acid ethyl esters (LOVAZA) 1 g capsule TAKE 2 CAPSULES BY MOUTH 2 (TWO) TIMES A DAY.    omeprazole (PrilOSEC) 20 MG capsule Take 1 capsule by mouth Daily.    ondansetron ODT (Zofran ODT) 4 MG disintegrating tablet Place 1 tablet on the tongue Every 8 (Eight) Hours As Needed for Nausea or Vomiting.    sertraline (ZOLOFT) 25 MG tablet TAKE 1 TABLET BY MOUTH DAILY.     No current facility-administered medications on file prior to visit.      Past Medical, Surgical, Social, and Family History:  Past Medical History:   Diagnosis Date    Depression     Vertigo      History reviewed. No pertinent surgical history.  Social History     Socioeconomic History    Marital status:    Tobacco Use    Smoking status: Never    Smokeless tobacco: Never   Vaping Use    Vaping Use: Never used   Substance and Sexual Activity    Alcohol use: No    Drug use: No     History reviewed. No pertinent family history.  Objective   Vital Signs  /82   Pulse 84   Temp 96.9 øF (36.1 øC) (Temporal)   Resp 18   Ht 170.2 cm (67.01\")   Wt 67.1 kg (148 lb)   SpO2 98%   BMI 23.17 kg/mý     Physical Exam  Constitutional:       General: She is not in acute distress.     Comments: Weak and tired-appearing.   Pulmonary:      Effort: Pulmonary " effort is normal. No respiratory distress.   Neurological:      Mental Status: She is alert.     Prior Visit Notes/Records, Lab, Imaging, and Diagnostic Results Reviewed:  CBC:  Lab Results - Last 18 Months   Lab Units 08/17/23  0802   WBC 10*3/mm3 6.39   HEMOGLOBIN g/dL 13.0   HEMATOCRIT % 39.3   PLATELETS 10*3/mm3 207      Chemistry:  Lab Results - Last 18 Months   Lab Units 08/17/23  0802   SODIUM mmol/L 139   POTASSIUM mmol/L 4.4   CHLORIDE mmol/L 103   CO2 mmol/L 25.7   GLUCOSE mg/dL 87   BUN mg/dL 12   CREATININE mg/dL 0.70   EGFR RESULT mL/min/1.73 105.5   CALCIUM mg/dL 9.4     BMI Trend:  BMI Readings from Last 10 Encounters:   08/25/23 23.17 kg/mý   08/17/23 23.20 kg/mý   03/16/23 23.71 kg/mý   10/19/22 23.80 kg/mý   07/14/22 24.11 kg/mý   03/18/22 23.49 kg/mý   02/08/22 26.93 kg/mý   01/05/22 26.39 kg/mý   08/04/21 27.63 kg/mý   07/14/21 27.63 kg/mý     Assessment & Plan   Diagnoses and all orders for this visit:    1. Body aches (Primary)  -     POCT VERITOR SARS-CoV-2 Antigen    2. Acute cough  -     POCT VERITOR SARS-CoV-2 Antigen    3. COVID-19    Other orders  -     ondansetron (Zofran) 4 MG tablet; Take 1 tablet by mouth Every 8 (Eight) Hours As Needed for Nausea.  Dispense: 12 tablet; Refill: 0  -     Nirmatrelvir&Ritonavir 300/100 (PAXLOVID) 20 x 150 MG & 10 x 100MG tablet therapy pack tablet; Take 3 tablets by mouth 2 (Two) Times a Day.  Dispense: 30 each; Refill: 0  -     azithromycin (Zithromax Z-Harjinder) 250 MG tablet; Take 2 tablets the first day, then 1 tablet daily for 4 days.  Dispense: 6 tablet; Refill: 0    Discussion:  Advised and educated plan of care. Advised Paxlovid with a Z-Harjinder and Zofran to follow. Advise quarantine guidelines and ED guidelines. All questions answered.     Follow-up:  Return for follow-up as needed.    Transcribed from ambient dictation for VALERIA Ricks by Viviane Watson.  08/25/23   10:28 CDT    I have personally performed the services described in this  document as transcribed by the above individual, and it is both accurate and complete.    Electronically signed by Shawn Goncalves, 08/25/23, 10:28 AM CDT.

## 2023-09-13 DIAGNOSIS — I10 ESSENTIAL HYPERTENSION: ICD-10-CM

## 2023-09-13 RX ORDER — LISINOPRIL 10 MG/1
TABLET ORAL
Qty: 30 TABLET | Refills: 5 | Status: SHIPPED | OUTPATIENT
Start: 2023-09-13

## 2023-11-03 DIAGNOSIS — F33.1 MODERATE EPISODE OF RECURRENT MAJOR DEPRESSIVE DISORDER: ICD-10-CM

## 2023-11-03 RX ORDER — SERTRALINE HYDROCHLORIDE 25 MG/1
25 TABLET, FILM COATED ORAL DAILY
Qty: 30 TABLET | Refills: 2 | Status: SHIPPED | OUTPATIENT
Start: 2023-11-03

## 2023-11-17 ENCOUNTER — OFFICE VISIT (OUTPATIENT)
Dept: FAMILY MEDICINE CLINIC | Facility: CLINIC | Age: 53
End: 2023-11-17

## 2023-11-17 VITALS
HEART RATE: 94 BPM | OXYGEN SATURATION: 98 % | SYSTOLIC BLOOD PRESSURE: 116 MMHG | DIASTOLIC BLOOD PRESSURE: 76 MMHG | WEIGHT: 146.6 LBS | TEMPERATURE: 97.1 F | RESPIRATION RATE: 18 BRPM | HEIGHT: 67 IN | BODY MASS INDEX: 23.01 KG/M2

## 2023-11-17 DIAGNOSIS — J01.90 ACUTE NON-RECURRENT SINUSITIS, UNSPECIFIED LOCATION: ICD-10-CM

## 2023-11-17 DIAGNOSIS — R09.89 RESPIRATORY SYMPTOMS: Primary | ICD-10-CM

## 2023-11-17 LAB
EXPIRATION DATE: NORMAL
FLUAV AG UPPER RESP QL IA.RAPID: NOT DETECTED
FLUBV AG UPPER RESP QL IA.RAPID: NOT DETECTED
INTERNAL CONTROL: NORMAL
Lab: NORMAL
SARS-COV-2 AG UPPER RESP QL IA.RAPID: NOT DETECTED

## 2023-11-17 PROCEDURE — 99213 OFFICE O/P EST LOW 20 MIN: CPT | Performed by: NURSE PRACTITIONER

## 2023-11-17 PROCEDURE — 87428 SARSCOV & INF VIR A&B AG IA: CPT | Performed by: NURSE PRACTITIONER

## 2023-11-17 RX ORDER — CLARITHROMYCIN 500 MG/1
500 TABLET, COATED ORAL 2 TIMES DAILY
Qty: 20 TABLET | Refills: 0 | Status: SHIPPED | OUTPATIENT
Start: 2023-11-17

## 2023-11-17 NOTE — PROGRESS NOTES
"Subjective   Chief Complaint:  Nasal congestion and sinus pressure.    History of Present Illness:  This 53 y.o. female was seen in the office today.    The patient reports sinus congestion, sinus pressure, and nasal congestion x1 day. She reports an occasional light cough, but mainly having congestion in the back of her throat. She denies any fever, chills, or fatigue. She denies loss of taste or smell.    Allergies   Allergen Reactions    Penicillins Anxiety      Current Outpatient Medications on File Prior to Visit   Medication Sig    lisinopril (PRINIVIL,ZESTRIL) 10 MG tablet TAKE 1 TABLET BY MOUTH DAILY.    naproxen (NAPROSYN) 500 MG tablet Take 1 tablet by mouth 2 (Two) Times a Day With Meals.    omega-3 acid ethyl esters (LOVAZA) 1 g capsule TAKE 2 CAPSULES BY MOUTH 2 (TWO) TIMES A DAY.    omeprazole (PrilOSEC) 20 MG capsule Take 1 capsule by mouth Daily.    ondansetron (Zofran) 4 MG tablet Take 1 tablet by mouth Every 8 (Eight) Hours As Needed for Nausea.    ondansetron ODT (Zofran ODT) 4 MG disintegrating tablet Place 1 tablet on the tongue Every 8 (Eight) Hours As Needed for Nausea or Vomiting.    sertraline (ZOLOFT) 25 MG tablet TAKE 1 TABLET BY MOUTH DAILY.     No current facility-administered medications on file prior to visit.      Past Medical, Surgical, Social, and Family History:  Past Medical History:   Diagnosis Date    Depression     Vertigo      No past surgical history on file.  Social History     Socioeconomic History    Marital status:    Tobacco Use    Smoking status: Never    Smokeless tobacco: Never   Vaping Use    Vaping Use: Never used   Substance and Sexual Activity    Alcohol use: No    Drug use: No     No family history on file.    Prior Visit Notes/Records, Lab, Imaging, and Diagnostic Results Reviewed:  A1C:No results for input(s): \"HGBA1C\" in the last 26444 hours.  GLUCOSE:  Lab Results - Last 18 Months   Lab Units 08/17/23  0802   GLUCOSE mg/dL 87     LIPID:  Lab Results - " "Last 18 Months   Lab Units 08/17/23  0802   CHOLESTEROL mg/dL 193   LDL CHOL mg/dL 121*   HDL CHOL mg/dL 35*   TRIGLYCERIDES mg/dL 206*     PSA:No results for input(s): \"PSA\" in the last 65085 hours.  CBC:  Lab Results - Last 18 Months   Lab Units 08/17/23  0802   WBC 10*3/mm3 6.39   HEMOGLOBIN g/dL 13.0   HEMATOCRIT % 39.3   PLATELETS 10*3/mm3 207      BMP/CMP:  Lab Results - Last 18 Months   Lab Units 08/17/23  0802   SODIUM mmol/L 139   POTASSIUM mmol/L 4.4   CHLORIDE mmol/L 103   CO2 mmol/L 25.7   GLUCOSE mg/dL 87   BUN mg/dL 12   CREATININE mg/dL 0.70   EGFR RESULT mL/min/1.73 105.5   CALCIUM mg/dL 9.4     HEPATIC:  Lab Results - Last 18 Months   Lab Units 08/17/23  0802   ALT (SGPT) U/L 24   AST (SGOT) U/L 20   ALK PHOS U/L 74     Vit D:  Lab Results - Last 18 Months   Lab Units 08/17/23  0802   VIT D 25 HYDROXY ng/ml 31.8     THYROID:  Lab Results - Last 18 Months   Lab Units 08/17/23  0802   TSH uIU/mL 1.310     BMI Trend:  BMI Readings from Last 10 Encounters:   11/17/23 22.95 kg/m²   08/25/23 23.17 kg/m²   08/17/23 23.20 kg/m²   03/16/23 23.71 kg/m²   10/19/22 23.80 kg/m²   07/14/22 24.11 kg/m²   03/18/22 23.49 kg/m²   02/08/22 26.93 kg/m²   01/05/22 26.39 kg/m²   08/04/21 27.63 kg/m²     Objective   Vital Signs  /76 (BP Location: Right arm, Patient Position: Sitting, Cuff Size: Adult)   Pulse 94   Temp 97.1 °F (36.2 °C) (Infrared)   Resp 18   Ht 170.2 cm (67.01\")   Wt 66.5 kg (146 lb 9.6 oz)   SpO2 98%   BMI 22.95 kg/m²   Physical Exam  Vitals reviewed.   Constitutional:       General: She is not in acute distress.     Appearance: Normal appearance.   HENT:      Ears:      Comments: Bilateral ear congestion present.     Mouth/Throat:      Comments: Throat with thick yellow postnasal drip.    Cardiovascular:      Rate and Rhythm: Normal rate and regular rhythm.   Pulmonary:      Effort: Pulmonary effort is normal.      Breath sounds: Normal breath sounds.     Assessment & Plan   Diagnoses and " all orders for this visit:    1. Respiratory symptoms (Primary)  -     Covid-19 + Flu A&B AG, Veritor    2. Acute non-recurrent sinusitis, unspecified location    Other orders  -     clarithromycin (BIAXIN) 500 MG tablet; Take 1 tablet by mouth 2 (Two) Times a Day.  Dispense: 20 tablet; Refill: 0    Discussions & Anticipatory Guidance:  Advised and educated plan of care.    The patient will Return for follow-up as needed.  Advised influenza and COVID-19 negative. We will proceed with antibiotics.    I have personally performed the services described in this document as transcribed by the above individual, and it is both accurate and complete.    Transcribed from ambient dictation for VALERIA Ricks by Sina Fang.  11/17/23   16:09 CST    Electronically signed by Shawn Goncalves, 11/17/23, 4:09 PM CST.

## 2023-12-15 RX ORDER — OMEGA-3-ACID ETHYL ESTERS 1 G/1
2 CAPSULE, LIQUID FILLED ORAL 2 TIMES DAILY
Qty: 120 CAPSULE | Refills: 5 | Status: SHIPPED | OUTPATIENT
Start: 2023-12-15

## 2024-02-05 DIAGNOSIS — F33.1 MODERATE EPISODE OF RECURRENT MAJOR DEPRESSIVE DISORDER: ICD-10-CM

## 2024-02-05 RX ORDER — SERTRALINE HYDROCHLORIDE 25 MG/1
25 TABLET, FILM COATED ORAL DAILY
Qty: 30 TABLET | Refills: 2 | Status: SHIPPED | OUTPATIENT
Start: 2024-02-05

## 2024-04-30 DIAGNOSIS — I10 ESSENTIAL HYPERTENSION: ICD-10-CM

## 2024-04-30 RX ORDER — LISINOPRIL 10 MG/1
TABLET ORAL
Qty: 30 TABLET | Refills: 5 | Status: SHIPPED | OUTPATIENT
Start: 2024-04-30

## 2024-06-05 DIAGNOSIS — F33.1 MODERATE EPISODE OF RECURRENT MAJOR DEPRESSIVE DISORDER: ICD-10-CM

## 2024-06-05 RX ORDER — SERTRALINE HYDROCHLORIDE 25 MG/1
25 TABLET, FILM COATED ORAL DAILY
Qty: 30 TABLET | Refills: 2 | Status: SHIPPED | OUTPATIENT
Start: 2024-06-05

## 2024-06-07 ENCOUNTER — OFFICE VISIT (OUTPATIENT)
Dept: FAMILY MEDICINE CLINIC | Facility: CLINIC | Age: 54
End: 2024-06-07

## 2024-06-07 VITALS
DIASTOLIC BLOOD PRESSURE: 90 MMHG | HEART RATE: 63 BPM | TEMPERATURE: 97.6 F | HEIGHT: 67 IN | BODY MASS INDEX: 23.29 KG/M2 | OXYGEN SATURATION: 98 % | RESPIRATION RATE: 18 BRPM | SYSTOLIC BLOOD PRESSURE: 132 MMHG | WEIGHT: 148.4 LBS

## 2024-06-07 DIAGNOSIS — K59.00 CONSTIPATION, UNSPECIFIED CONSTIPATION TYPE: ICD-10-CM

## 2024-06-07 DIAGNOSIS — I10 ESSENTIAL HYPERTENSION: ICD-10-CM

## 2024-06-07 DIAGNOSIS — K31.89 HYPERACIDITY: Primary | ICD-10-CM

## 2024-06-07 RX ORDER — OMEPRAZOLE 40 MG/1
40 CAPSULE, DELAYED RELEASE ORAL DAILY
Qty: 21 CAPSULE | Refills: 0 | Status: SHIPPED | OUTPATIENT
Start: 2024-06-07

## 2024-06-07 RX ORDER — ONDANSETRON 4 MG/1
4 TABLET, FILM COATED ORAL EVERY 8 HOURS PRN
Qty: 12 TABLET | Refills: 0 | Status: SHIPPED | OUTPATIENT
Start: 2024-06-07

## 2024-06-07 NOTE — PROGRESS NOTES
Subjective   Chief Complaint:  GI concern    History of Present Illness  The patient is a 53-year-old female presenting today with hyperacidity, increased belching and upper respiratory gas along with intermittent constipation.    The patient reports experiencing increased belching, accompanied by upper respiratory gas, accompanied by intermittent constipation.    Review of Systems    Past Medical, Surgical, Social, and Family History:  Allergies   Allergen Reactions    Penicillins Anxiety      Current Outpatient Medications on File Prior to Visit   Medication Sig    lisinopril (PRINIVIL,ZESTRIL) 10 MG tablet TAKE 1 TABLET BY MOUTH DAILY.    omega-3 acid ethyl esters (LOVAZA) 1 g capsule TAKE 2 CAPSULES BY MOUTH 2 (TWO) TIMES A DAY.    sertraline (ZOLOFT) 25 MG tablet TAKE 1 TABLET BY MOUTH DAILY.    [DISCONTINUED] naproxen (NAPROSYN) 500 MG tablet Take 1 tablet by mouth 2 (Two) Times a Day With Meals.    [DISCONTINUED] omeprazole (PrilOSEC) 20 MG capsule Take 1 capsule by mouth Daily.    [DISCONTINUED] ondansetron (Zofran) 4 MG tablet Take 1 tablet by mouth Every 8 (Eight) Hours As Needed for Nausea.    [DISCONTINUED] ondansetron ODT (Zofran ODT) 4 MG disintegrating tablet Place 1 tablet on the tongue Every 8 (Eight) Hours As Needed for Nausea or Vomiting.    [DISCONTINUED] clarithromycin (BIAXIN) 500 MG tablet Take 1 tablet by mouth 2 (Two) Times a Day. (Patient not taking: Reported on 6/7/2024)     No current facility-administered medications on file prior to visit.      Past Medical History:   Diagnosis Date    Depression     Vertigo      No past surgical history on file.  Social History     Socioeconomic History    Marital status:    Tobacco Use    Smoking status: Never     Passive exposure: Never    Smokeless tobacco: Never   Vaping Use    Vaping status: Never Used   Substance and Sexual Activity    Alcohol use: No    Drug use: No     No family history on file.    Prior Visit Notes/Records, Lab,  "Imaging, and Diagnostic Results Reviewed:  A1C:No results for input(s): \"HGBA1C\" in the last 99240 hours.  GLUCOSE:  Lab Results - Last 18 Months   Lab Units 08/17/23  0802   GLUCOSE mg/dL 87     LIPID:  Lab Results - Last 18 Months   Lab Units 08/17/23  0802   CHOLESTEROL mg/dL 193   LDL CHOL mg/dL 121*   HDL CHOL mg/dL 35*   TRIGLYCERIDES mg/dL 206*     PSA:No results for input(s): \"PSA\" in the last 86341 hours.  CBC:  Lab Results - Last 18 Months   Lab Units 08/17/23  0802   WBC 10*3/mm3 6.39   HEMOGLOBIN g/dL 13.0   HEMATOCRIT % 39.3   PLATELETS 10*3/mm3 207      BMP/CMP:  Lab Results - Last 18 Months   Lab Units 08/17/23  0802   SODIUM mmol/L 139   POTASSIUM mmol/L 4.4   CHLORIDE mmol/L 103   CO2 mmol/L 25.7   GLUCOSE mg/dL 87   BUN mg/dL 12   CREATININE mg/dL 0.70   EGFR RESULT mL/min/1.73 105.5   CALCIUM mg/dL 9.4     HEPATIC:  Lab Results - Last 18 Months   Lab Units 08/17/23  0802   ALT (SGPT) U/L 24   AST (SGOT) U/L 20   ALK PHOS U/L 74     Vit D:  Lab Results - Last 18 Months   Lab Units 08/17/23  0802   VIT D 25 HYDROXY ng/ml 31.8     THYROID:  Lab Results - Last 18 Months   Lab Units 08/17/23  0802   TSH uIU/mL 1.310     BMI Trend:  BMI Readings from Last 10 Encounters:   06/07/24 23.24 kg/m²   11/17/23 22.95 kg/m²   08/25/23 23.17 kg/m²   08/17/23 23.20 kg/m²   03/16/23 23.71 kg/m²   10/19/22 23.80 kg/m²   07/14/22 24.11 kg/m²   03/18/22 23.49 kg/m²   02/08/22 26.93 kg/m²   01/05/22 26.39 kg/m²     Objective   Vital Signs  /90 (BP Location: Left arm, Patient Position: Sitting, Cuff Size: Large Adult)   Pulse 63   Temp 97.6 °F (36.4 °C) (Infrared)   Resp 18   Ht 170.2 cm (67.01\")   Wt 67.3 kg (148 lb 6.4 oz)   LMP 06/07/2023 (Within Months)   SpO2 98%   BMI 23.24 kg/m²   Physical Exam  Vitals reviewed.   Constitutional:       General: She is not in acute distress.     Appearance: Normal appearance.   Cardiovascular:      Rate and Rhythm: Normal rate and regular rhythm.   Pulmonary:      " Effort: Pulmonary effort is normal.      Breath sounds: Normal breath sounds.   Abdominal:      General: Bowel sounds are normal. There is no distension.      Tenderness: There is abdominal tenderness (mild mid abdomen just above umbilicus).       Assessment & Plan   Diagnoses and all orders for this visit:    1. Hyperacidity (Primary)  -     H. Pylori Breath Test - Breath, Lung    2. Constipation, unspecified constipation type    3. Essential hypertension  -     CBC & Differential  -     Comprehensive Metabolic Panel  -     TSH  -     Lipid Panel    Other orders  -     omeprazole (priLOSEC) 40 MG capsule; Take 1 capsule by mouth Daily.  Dispense: 21 capsule; Refill: 0  -     ondansetron (Zofran) 4 MG tablet; Take 1 tablet by mouth Every 8 (Eight) Hours As Needed for Nausea or Vomiting.  Dispense: 12 tablet; Refill: 0    Discussions & Anticipatory Guidance:  Advised and educated plan of care.      The patient will Return for follow-up as needed pending results - we will call.      SACHIN Co- used for dictation.    Electronically signed by VALREIA Ricks, 06/07/24, 10:42 AM CDT.

## 2024-06-09 LAB
ALBUMIN SERPL-MCNC: 4.4 G/DL (ref 3.5–5.2)
ALBUMIN/GLOB SERPL: 1.4 G/DL
ALP SERPL-CCNC: 89 U/L (ref 39–117)
ALT SERPL-CCNC: 25 U/L (ref 1–33)
AST SERPL-CCNC: 21 U/L (ref 1–32)
BASOPHILS # BLD AUTO: 0.06 10*3/MM3 (ref 0–0.2)
BASOPHILS NFR BLD AUTO: 1 % (ref 0–1.5)
BILIRUB SERPL-MCNC: 0.3 MG/DL (ref 0–1.2)
BUN SERPL-MCNC: 13 MG/DL (ref 6–20)
BUN/CREAT SERPL: 21 (ref 7–25)
CALCIUM SERPL-MCNC: 9.4 MG/DL (ref 8.6–10.5)
CHLORIDE SERPL-SCNC: 104 MMOL/L (ref 98–107)
CHOLEST SERPL-MCNC: 208 MG/DL (ref 0–200)
CO2 SERPL-SCNC: 26.3 MMOL/L (ref 22–29)
CREAT SERPL-MCNC: 0.62 MG/DL (ref 0.57–1)
EGFRCR SERPLBLD CKD-EPI 2021: 108 ML/MIN/1.73
EOSINOPHIL # BLD AUTO: 0.1 10*3/MM3 (ref 0–0.4)
EOSINOPHIL NFR BLD AUTO: 1.6 % (ref 0.3–6.2)
ERYTHROCYTE [DISTWIDTH] IN BLOOD BY AUTOMATED COUNT: 13.5 % (ref 12.3–15.4)
GLOBULIN SER CALC-MCNC: 3.2 GM/DL
GLUCOSE SERPL-MCNC: 90 MG/DL (ref 65–99)
HCT VFR BLD AUTO: 41.1 % (ref 34–46.6)
HDLC SERPL-MCNC: 36 MG/DL (ref 40–60)
HGB BLD-MCNC: 13.1 G/DL (ref 12–15.9)
IMM GRANULOCYTES # BLD AUTO: 0.01 10*3/MM3 (ref 0–0.05)
IMM GRANULOCYTES NFR BLD AUTO: 0.2 % (ref 0–0.5)
LDLC SERPL CALC-MCNC: 140 MG/DL (ref 0–100)
LYMPHOCYTES # BLD AUTO: 2.21 10*3/MM3 (ref 0.7–3.1)
LYMPHOCYTES NFR BLD AUTO: 36.3 % (ref 19.6–45.3)
MCH RBC QN AUTO: 28.4 PG (ref 26.6–33)
MCHC RBC AUTO-ENTMCNC: 31.9 G/DL (ref 31.5–35.7)
MCV RBC AUTO: 89 FL (ref 79–97)
MONOCYTES # BLD AUTO: 0.7 10*3/MM3 (ref 0.1–0.9)
MONOCYTES NFR BLD AUTO: 11.5 % (ref 5–12)
NEUTROPHILS # BLD AUTO: 3 10*3/MM3 (ref 1.7–7)
NEUTROPHILS NFR BLD AUTO: 49.4 % (ref 42.7–76)
NRBC BLD AUTO-RTO: 0 /100 WBC (ref 0–0.2)
PLATELET # BLD AUTO: 215 10*3/MM3 (ref 140–450)
POTASSIUM SERPL-SCNC: 4.3 MMOL/L (ref 3.5–5.2)
PROT SERPL-MCNC: 7.6 G/DL (ref 6–8.5)
RBC # BLD AUTO: 4.62 10*6/MM3 (ref 3.77–5.28)
SODIUM SERPL-SCNC: 141 MMOL/L (ref 136–145)
TRIGL SERPL-MCNC: 176 MG/DL (ref 0–150)
TSH SERPL DL<=0.005 MIU/L-ACNC: 1.55 UIU/ML (ref 0.27–4.2)
UREA BREATH TEST QL: POSITIVE
VLDLC SERPL CALC-MCNC: 32 MG/DL (ref 5–40)
WBC # BLD AUTO: 6.08 10*3/MM3 (ref 3.4–10.8)

## 2024-06-10 RX ORDER — AMOXICILLIN 500 MG/1
1000 CAPSULE ORAL 2 TIMES DAILY
Qty: 40 CAPSULE | Refills: 0 | Status: SHIPPED | OUTPATIENT
Start: 2024-06-10 | End: 2024-06-20

## 2024-06-10 RX ORDER — ROSUVASTATIN CALCIUM 20 MG/1
20 TABLET, COATED ORAL NIGHTLY
Qty: 30 TABLET | Refills: 5 | Status: SHIPPED | OUTPATIENT
Start: 2024-06-10

## 2024-06-10 RX ORDER — CLARITHROMYCIN 500 MG/1
500 TABLET, COATED ORAL 2 TIMES DAILY
Qty: 20 TABLET | Refills: 0 | Status: SHIPPED | OUTPATIENT
Start: 2024-06-10

## 2024-06-10 NOTE — PROGRESS NOTES
Please call result -cholesterol elevated-see if okay with treating with medication    H. pylori positive-I have sent meds.    Electronically signed by VALERIA Ricks, 06/10/24, 7:37 AM CDT.

## 2024-09-20 ENCOUNTER — OFFICE VISIT (OUTPATIENT)
Dept: FAMILY MEDICINE CLINIC | Facility: CLINIC | Age: 54
End: 2024-09-20

## 2024-09-20 VITALS
DIASTOLIC BLOOD PRESSURE: 78 MMHG | SYSTOLIC BLOOD PRESSURE: 116 MMHG | TEMPERATURE: 97.5 F | WEIGHT: 150.5 LBS | HEART RATE: 71 BPM | BODY MASS INDEX: 23.62 KG/M2 | HEIGHT: 67 IN | OXYGEN SATURATION: 100 %

## 2024-09-20 DIAGNOSIS — I10 ESSENTIAL HYPERTENSION: ICD-10-CM

## 2024-09-20 DIAGNOSIS — F33.1 MODERATE EPISODE OF RECURRENT MAJOR DEPRESSIVE DISORDER: ICD-10-CM

## 2024-09-20 DIAGNOSIS — R51.9 ACUTE NONINTRACTABLE HEADACHE, UNSPECIFIED HEADACHE TYPE: ICD-10-CM

## 2024-09-20 DIAGNOSIS — J02.9 ACUTE PHARYNGITIS, UNSPECIFIED ETIOLOGY: ICD-10-CM

## 2024-09-20 DIAGNOSIS — E78.2 MIXED HYPERLIPIDEMIA: ICD-10-CM

## 2024-09-20 DIAGNOSIS — J02.9 SORE THROAT: Primary | ICD-10-CM

## 2024-09-20 PROCEDURE — 87428 SARSCOV & INF VIR A&B AG IA: CPT | Performed by: NURSE PRACTITIONER

## 2024-09-20 PROCEDURE — 99214 OFFICE O/P EST MOD 30 MIN: CPT | Performed by: NURSE PRACTITIONER

## 2024-09-20 RX ORDER — AZITHROMYCIN 250 MG/1
TABLET, FILM COATED ORAL
Qty: 6 TABLET | Refills: 0 | Status: SHIPPED | OUTPATIENT
Start: 2024-09-20 | End: 2024-09-25

## 2024-12-04 ENCOUNTER — OFFICE VISIT (OUTPATIENT)
Dept: FAMILY MEDICINE CLINIC | Facility: CLINIC | Age: 54
End: 2024-12-04

## 2024-12-04 VITALS
HEIGHT: 67 IN | BODY MASS INDEX: 24.01 KG/M2 | DIASTOLIC BLOOD PRESSURE: 82 MMHG | HEART RATE: 59 BPM | SYSTOLIC BLOOD PRESSURE: 118 MMHG | OXYGEN SATURATION: 100 % | WEIGHT: 153 LBS

## 2024-12-04 DIAGNOSIS — R19.4 CHANGE IN BOWEL HABITS: Primary | ICD-10-CM

## 2024-12-06 NOTE — PROGRESS NOTES
"Subjective   Chief Complaint:  Change in bowel habit    History of Present Illness  The patient is a 54-year-old female who reports intermittent abdominal discomfort.    She reports no current pain, but has experienced increased constipation over the past few months. Despite consuming high-fiber and fruit shakes to alleviate her constipation, she reports that this has not been effective.    Past Medical, Surgical, Social, and Family History:  Allergies   Allergen Reactions    Penicillins Anxiety      Past Medical History:   Diagnosis Date    Depression     Vertigo     No past surgical history on file.   Social History     Socioeconomic History    Marital status:    Tobacco Use    Smoking status: Never     Passive exposure: Never    Smokeless tobacco: Never   Vaping Use    Vaping status: Never Used   Substance and Sexual Activity    Alcohol use: No    Drug use: No    No family history on file.    Objective   Vital Signs  /82   Pulse 59   Ht 170.2 cm (67.01\")   Wt 69.4 kg (153 lb)   SpO2 100%   BMI 23.96 kg/m²    Physical Exam  Vitals reviewed.   Constitutional:       General: She is not in acute distress.     Appearance: Normal appearance.   Cardiovascular:      Rate and Rhythm: Normal rate and regular rhythm.   Pulmonary:      Effort: Pulmonary effort is normal.      Breath sounds: Normal breath sounds.   Abdominal:      General: Abdomen is flat. Bowel sounds are normal. There is no distension.      Palpations: Abdomen is soft. There is no mass.      Tenderness: There is no abdominal tenderness.       Assessment & Plan   Assessment & Plan  1. Change in bowel habits.  Referral to gastroenterology.    2. Chronic constipation.  Linzess 72 mcg daily. Advise as an alternative if insurance does not approve MiraLAX 17 g 1-4 times per day.    Referral to gastroenterology.    Follow-up:  The patient will Return for follow-up as needed.    Records and Results Reviewed:  I reviewed current medications as " given by patient and allergy list    BMI is within normal parameters. No other follow-up for BMI required.    : Hybrid SACHIN [employer]- and Dragon Speech Recognition - No recording technology was used in the exam room during encounter.    Electronically signed by VALERIA Ricks, 12/06/24, 9:43 AM CST.

## 2024-12-21 DIAGNOSIS — F33.1 MODERATE EPISODE OF RECURRENT MAJOR DEPRESSIVE DISORDER: ICD-10-CM

## 2024-12-23 RX ORDER — SERTRALINE HYDROCHLORIDE 25 MG/1
25 TABLET, FILM COATED ORAL DAILY
Qty: 30 TABLET | Refills: 2 | Status: SHIPPED | OUTPATIENT
Start: 2024-12-23

## 2024-12-30 DIAGNOSIS — I10 ESSENTIAL HYPERTENSION: ICD-10-CM

## 2024-12-30 RX ORDER — LISINOPRIL 10 MG/1
TABLET ORAL
Qty: 30 TABLET | Refills: 5 | Status: SHIPPED | OUTPATIENT
Start: 2024-12-30

## 2024-12-30 RX ORDER — ROSUVASTATIN CALCIUM 20 MG/1
20 TABLET, COATED ORAL NIGHTLY
Qty: 30 TABLET | Refills: 5 | Status: SHIPPED | OUTPATIENT
Start: 2024-12-30

## 2025-01-03 ENCOUNTER — TELEPHONE (OUTPATIENT)
Dept: GASTROENTEROLOGY | Facility: CLINIC | Age: 55
End: 2025-01-03

## 2025-01-03 NOTE — TELEPHONE ENCOUNTER
Left message to move appointment-Thee on call at the hospital-can switch to a different provider with sooner availability since she is a new patient

## 2025-02-13 ENCOUNTER — OFFICE VISIT (OUTPATIENT)
Dept: GASTROENTEROLOGY | Facility: CLINIC | Age: 55
End: 2025-02-13

## 2025-02-13 VITALS
SYSTOLIC BLOOD PRESSURE: 124 MMHG | TEMPERATURE: 97.6 F | BODY MASS INDEX: 23.54 KG/M2 | HEIGHT: 67 IN | DIASTOLIC BLOOD PRESSURE: 74 MMHG | WEIGHT: 150 LBS | HEART RATE: 55 BPM | OXYGEN SATURATION: 99 %

## 2025-02-13 DIAGNOSIS — K59.00 CONSTIPATION, UNSPECIFIED CONSTIPATION TYPE: ICD-10-CM

## 2025-02-13 DIAGNOSIS — R19.4 CHANGE IN BOWEL HABIT: Primary | ICD-10-CM

## 2025-02-13 RX ORDER — SODIUM, POTASSIUM,MAG SULFATES 17.5-3.13G
2 SOLUTION, RECONSTITUTED, ORAL ORAL TAKE AS DIRECTED
Qty: 354 ML | Refills: 0 | Status: CANCELLED | OUTPATIENT
Start: 2025-02-13

## 2025-02-13 RX ORDER — SODIUM PICOSULFATE, MAGNESIUM OXIDE, AND ANHYDROUS CITRIC ACID 10; 3.5; 12 MG/160ML; G/160ML; G/160ML
1 LIQUID ORAL TAKE AS DIRECTED
Qty: 320 ML | Refills: 0 | Status: CANCELLED | OUTPATIENT
Start: 2025-02-13

## 2025-02-13 NOTE — PROGRESS NOTES
Chief Complaint   Patient presents with    Constipation     Has constipation stomach bloting never had colon       PCP: Shawn Goncalves APRN  REFER: Shawn Goncalves APRN    Subjective       HPI      Haley Jewell presents to office with complain of change in bowel habit.  Currently bowels described as constipation,  moving irregular.   Prior bowel habit described as moving moving daily. .  She does not  have abdominal pain.  No bright red blood per rectum, no melena.   No weight loss.      Increasing dietary fiber did not improve bowel habit.  She has never taken anything on regular basis to facilitate bowel movement.   Started on Linzess with  improvement but stopped and has attempted dietary changes.   She has increased prune consumption with reported improvement in bowels.       Past Medical History:   Diagnosis Date    Depression     Vertigo        History reviewed. No pertinent surgical history.    Outpatient Medications Marked as Taking for the 2/13/25 encounter (Office Visit) with Thee Phipps APRN   Medication Sig Dispense Refill    lisinopril (PRINIVIL,ZESTRIL) 10 MG tablet TAKE 1 TABLET BY MOUTH DAILY. 30 tablet 5    rosuvastatin (CRESTOR) 20 MG tablet TAKE 1 TABLET BY MOUTH EVERY NIGHT. 30 tablet 5    sertraline (ZOLOFT) 25 MG tablet TAKE 1 TABLET BY MOUTH DAILY. 30 tablet 2       Allergies   Allergen Reactions    Penicillins Anxiety       Social History     Socioeconomic History    Marital status:    Tobacco Use    Smoking status: Never     Passive exposure: Never    Smokeless tobacco: Never   Vaping Use    Vaping status: Never Used   Substance and Sexual Activity    Alcohol use: No    Drug use: No       Review of Systems   Constitutional:  Negative for fever and unexpected weight change.   HENT:  Negative for trouble swallowing.    Respiratory:  Negative for shortness of breath.    Cardiovascular:  Negative for chest pain.   Gastrointestinal:  Positive for constipation. Negative for  "abdominal pain and anal bleeding.       Objective     Vitals:    02/13/25 1327   BP: 124/74   Pulse: 55   Temp: 97.6 °F (36.4 °C)   SpO2: 99%   Weight: 68 kg (150 lb)   Height: 170.2 cm (67\")     Body mass index is 23.49 kg/m².    Physical Exam  Constitutional:       Appearance: Normal appearance. She is well-developed.   Eyes:      General: No scleral icterus.  Cardiovascular:      Heart sounds: Normal heart sounds. No murmur heard.  Pulmonary:      Effort: Pulmonary effort is normal.   Abdominal:      General: Bowel sounds are normal. There is no distension.      Palpations: Abdomen is soft.      Tenderness: There is no abdominal tenderness. There is no guarding.   Skin:     General: Skin is warm and dry.      Coloration: Skin is not jaundiced.   Neurological:      Mental Status: She is alert.   Psychiatric:         Behavior: Behavior is cooperative.         Imaging Results (Most Recent)       None            Body mass index is 23.49 kg/m².    Assessment & Plan     Diagnoses and all orders for this visit:    1. Change in bowel habit (Primary)  -     Case Request; Standing  -     Case Request    2. Constipation, unspecified constipation type    Other orders  -     Implement Anesthesia Orders Day of Procedure; Standing  -     Follow Anesthesia Guidelines / Protocol; Future        COLONOSCOPY WITH ANESTHESIA- (examination of colon) (N/A)      Recommend daily use of Miralax, adjust as needed to facilitate comfortable bowel movement   Encouraged addition of dietary fiber, increasing daily water consumption, as well daily physical activity  Discussed decrease use of caffeine as caffeine consumption can effect bowel habit     Continue dietary changes    Ok to resume linzess if needed    Advised pt to stop use of NSAIDs, Fish Oil, and MV 5 days prior to procedure, per Dr Cruz protocol.  Tylenol based products are ok to take.  Pt verbalized understanding.    All risks, benefits, alternatives, and indications of " colonoscopy procedure have been discussed with the patient. Risks to include perforation of the colon requiring possible surgery or colostomy, risk of bleeding from biopsies or removal of colon tissue, possibility of missing a colon polyp or cancer, or adverse drug reaction.  Benefits to include the diagnosis and management of disease of the colon and rectum. Alternatives to include barium enema, radiographic evaluation, lab testing or no intervention. Pt verbalizes understanding and agrees to proceed with procedure.         Thee Phipps, APRN  02/14/25

## 2025-03-25 ENCOUNTER — HOSPITAL ENCOUNTER (OUTPATIENT)
Facility: HOSPITAL | Age: 55
Setting detail: HOSPITAL OUTPATIENT SURGERY
Discharge: HOME | End: 2025-03-25
Attending: INTERNAL MEDICINE | Admitting: INTERNAL MEDICINE

## 2025-03-25 ENCOUNTER — ANESTHESIA EVENT (OUTPATIENT)
Dept: GASTROENTEROLOGY | Facility: HOSPITAL | Age: 55
End: 2025-03-25

## 2025-03-25 ENCOUNTER — TELEPHONE (OUTPATIENT)
Dept: GASTROENTEROLOGY | Facility: CLINIC | Age: 55
End: 2025-03-25

## 2025-03-25 ENCOUNTER — ANESTHESIA (OUTPATIENT)
Dept: GASTROENTEROLOGY | Facility: HOSPITAL | Age: 55
End: 2025-03-25

## 2025-03-25 VITALS
HEART RATE: 75 BPM | OXYGEN SATURATION: 97 % | TEMPERATURE: 97.3 F | DIASTOLIC BLOOD PRESSURE: 80 MMHG | HEIGHT: 62 IN | BODY MASS INDEX: 27.05 KG/M2 | RESPIRATION RATE: 18 BRPM | SYSTOLIC BLOOD PRESSURE: 145 MMHG | WEIGHT: 147 LBS

## 2025-03-25 PROCEDURE — 25810000003 SODIUM CHLORIDE 0.9 % SOLUTION: Performed by: NURSE ANESTHETIST, CERTIFIED REGISTERED

## 2025-03-25 PROCEDURE — 25810000003 SODIUM CHLORIDE 0.9 % SOLUTION: Performed by: ANESTHESIOLOGY

## 2025-03-25 PROCEDURE — 25010000002 PROPOFOL 10 MG/ML EMULSION: Performed by: NURSE ANESTHETIST, CERTIFIED REGISTERED

## 2025-03-25 PROCEDURE — 25010000002 LIDOCAINE PF 2% 2 % SOLUTION: Performed by: NURSE ANESTHETIST, CERTIFIED REGISTERED

## 2025-03-25 PROCEDURE — 45378 DIAGNOSTIC COLONOSCOPY: CPT | Performed by: INTERNAL MEDICINE

## 2025-03-25 RX ORDER — SODIUM CHLORIDE 0.9 % (FLUSH) 0.9 %
10 SYRINGE (ML) INJECTION AS NEEDED
Status: DISCONTINUED | OUTPATIENT
Start: 2025-03-25 | End: 2025-03-25 | Stop reason: HOSPADM

## 2025-03-25 RX ORDER — PROPOFOL 10 MG/ML
VIAL (ML) INTRAVENOUS AS NEEDED
Status: DISCONTINUED | OUTPATIENT
Start: 2025-03-25 | End: 2025-03-25 | Stop reason: SURG

## 2025-03-25 RX ORDER — SODIUM CHLORIDE 9 MG/ML
INJECTION, SOLUTION INTRAVENOUS CONTINUOUS PRN
Status: DISCONTINUED | OUTPATIENT
Start: 2025-03-25 | End: 2025-03-25 | Stop reason: SURG

## 2025-03-25 RX ORDER — SODIUM CHLORIDE 9 MG/ML
500 INJECTION, SOLUTION INTRAVENOUS ONCE
Status: COMPLETED | OUTPATIENT
Start: 2025-03-25 | End: 2025-03-25

## 2025-03-25 RX ORDER — LIDOCAINE HYDROCHLORIDE 10 MG/ML
0.5 INJECTION, SOLUTION EPIDURAL; INFILTRATION; INTRACAUDAL; PERINEURAL ONCE AS NEEDED
Status: DISCONTINUED | OUTPATIENT
Start: 2025-03-25 | End: 2025-03-25 | Stop reason: HOSPADM

## 2025-03-25 RX ORDER — LIDOCAINE HYDROCHLORIDE 20 MG/ML
INJECTION, SOLUTION EPIDURAL; INFILTRATION; INTRACAUDAL; PERINEURAL AS NEEDED
Status: DISCONTINUED | OUTPATIENT
Start: 2025-03-25 | End: 2025-03-25 | Stop reason: SURG

## 2025-03-25 RX ADMIN — PROPOFOL 100 MG: 10 INJECTION, EMULSION INTRAVENOUS at 12:34

## 2025-03-25 RX ADMIN — PROPOFOL 100 MG: 10 INJECTION, EMULSION INTRAVENOUS at 12:36

## 2025-03-25 RX ADMIN — SODIUM CHLORIDE 500 ML: 9 INJECTION, SOLUTION INTRAVENOUS at 12:14

## 2025-03-25 RX ADMIN — PROPOFOL 100 MG: 10 INJECTION, EMULSION INTRAVENOUS at 12:32

## 2025-03-25 RX ADMIN — PROPOFOL 100 MG: 10 INJECTION, EMULSION INTRAVENOUS at 12:30

## 2025-03-25 RX ADMIN — LIDOCAINE HYDROCHLORIDE 60 MG: 20 INJECTION, SOLUTION EPIDURAL; INFILTRATION; INTRACAUDAL; PERINEURAL at 12:30

## 2025-03-25 RX ADMIN — SODIUM CHLORIDE: 9 INJECTION, SOLUTION INTRAVENOUS at 12:27

## 2025-03-25 RX ADMIN — PROPOFOL 100 MG: 10 INJECTION, EMULSION INTRAVENOUS at 12:38

## 2025-03-25 NOTE — ANESTHESIA POSTPROCEDURE EVALUATION
"Patient: Haley Jewell    Procedure Summary       Date: 03/25/25 Room / Location: [facility] ENDOSCOPY 4 /  [facility]    Anesthesia Start: 1226 Anesthesia Stop: 1243    Procedure: COLONOSCOPY WITH ANESTHESIA- (examination of colon) Diagnosis:       Change in bowel habit      (Change in bowel habit [R19.4])    Surgeons: Roland Cruz DO Provider: Franklyn Zee CRNA    Anesthesia Type: MAC ASA Status: 2            Anesthesia Type: MAC    Vitals  Vitals Value Taken Time   /80 03/25/25 13:19   Temp     Pulse 75 03/25/25 13:19   Resp 18 03/25/25 13:19   SpO2 97 % 03/25/25 13:19           Post Anesthesia Care and Evaluation    Patient location during evaluation: PACU  Patient participation: complete - patient participated  Level of consciousness: awake and awake and alert  Pain score: 0  Pain management: adequate    Airway patency: patent  Anesthetic complications: No anesthetic complications  PONV Status: none  Cardiovascular status: acceptable  Respiratory status: acceptable  Hydration status: acceptable    Comments: Patient discharged according to acceptable Sheldon score per RN assessment. See nursing records for further information.     Blood pressure 145/80, pulse 75, temperature 97.3 °F (36.3 °C), temperature source Temporal, resp. rate 18, height 157.5 cm (62\"), weight 66.7 kg (147 lb), last menstrual period 06/07/2023, SpO2 97%, not currently breastfeeding.      "

## 2025-03-25 NOTE — ANESTHESIA PREPROCEDURE EVALUATION
Anesthesia Evaluation     Patient summary reviewed   no history of anesthetic complications:   NPO Solid Status: > 8 hours             Airway   Mallampati: II  No difficulty expected  Dental      Pulmonary    (-) asthma, sleep apnea  Cardiovascular   Exercise tolerance: good (4-7 METS)    (+) hypertension, hyperlipidemia      Neuro/Psych  (-) seizures, TIA, CVA  GI/Hepatic/Renal/Endo    (-) liver disease, no renal disease, diabetes    Musculoskeletal     Abdominal    Substance History      OB/GYN          Other                    Anesthesia Plan    ASA 2     MAC       Anesthetic plan, risks, benefits, and alternatives have been provided, discussed and informed consent has been obtained with: patient.    CODE STATUS:

## 2025-03-25 NOTE — H&P
"[facility]  Pre Procedure History & Physical    Chief Complaint:   Screening    Subjective     HPI:   Screening    Past Medical History:   Past Medical History:   Diagnosis Date    Depression     Elevated cholesterol     Hypertension     Vertigo        Past Surgical History:  Past Surgical History:   Procedure Laterality Date    NO PAST SURGERIES         Family History:  Family History   Problem Relation Age of Onset    Diabetes Mother     Liver cancer Father     Colon cancer Neg Hx     Colon polyps Neg Hx     Esophageal cancer Neg Hx        Social History:   reports that she has never smoked. She has never been exposed to tobacco smoke. She has never used smokeless tobacco. She reports that she does not drink alcohol and does not use drugs.    Medications:   Prior to Admission medications    Medication Sig Start Date End Date Taking? Authorizing Provider   lisinopril (PRINIVIL,ZESTRIL) 10 MG tablet TAKE 1 TABLET BY MOUTH DAILY. 12/30/24  Yes Shawn Goncalves APRN   linaclotide (Linzess) 72 MCG capsule capsule Take 1 capsule by mouth Every Morning Before Breakfast.  Patient not taking: Reported on 2/13/2025 12/4/24   Shawn Goncalves APRN   rosuvastatin (CRESTOR) 20 MG tablet TAKE 1 TABLET BY MOUTH EVERY NIGHT. 12/30/24   Shawn Goncalves APRN   sertraline (ZOLOFT) 25 MG tablet TAKE 1 TABLET BY MOUTH DAILY. 12/23/24   Shawn Goncalves APRN   omega-3 acid ethyl esters (LOVAZA) 1 g capsule TAKE 2 CAPSULES BY MOUTH 2 (TWO) TIMES A DAY.  Patient not taking: Reported on 2/13/2025 12/15/23 3/25/25  Shawn Goncalves APRN       Allergies:  Penicillins    ROS:    General: Weight stable  Resp: No SOA  Cardiovascular: No CP    Objective     Blood pressure 165/89, pulse 65, temperature 97.3 °F (36.3 °C), temperature source Temporal, resp. rate 16, height 157.5 cm (62\"), weight 66.7 kg (147 lb), last menstrual period 06/07/2023, SpO2 100%, not currently breastfeeding.    Physical Exam   Constitutional: Pt is " oriented to person, place, and in no distress.   HENT: Mouth/Throat: Oropharynx is clear.   Cardiovascular: Normal rate, regular rhythm.    Pulmonary/Chest: Effort normal. No respiratory distress. No  wheezes.   Abdominal: Soft. Non-distended.  Skin: Skin is warm and dry.   Psychiatric: Mood, memory, affect and judgment appear normal.     Assessment & Plan     Diagnosis:  Screening    Anticipated Surgical Procedure:  C-scope    The risks, benefits, and alternatives of this procedure have been discussed with the patient or the responsible party- the patient understands and agrees to proceed.

## 2025-03-26 LAB
QT INTERVAL: 404 MS
QTC INTERVAL: 413 MS

## 2025-03-26 PROCEDURE — 93005 ELECTROCARDIOGRAM TRACING: CPT | Performed by: FAMILY MEDICINE

## 2025-03-26 PROCEDURE — 99283 EMERGENCY DEPT VISIT LOW MDM: CPT

## 2025-03-27 ENCOUNTER — HOSPITAL ENCOUNTER (EMERGENCY)
Facility: HOSPITAL | Age: 55
Discharge: HOME | End: 2025-03-27
Attending: FAMILY MEDICINE

## 2025-03-27 VITALS
WEIGHT: 145.5 LBS | BODY MASS INDEX: 26.78 KG/M2 | HEIGHT: 62 IN | HEART RATE: 60 BPM | SYSTOLIC BLOOD PRESSURE: 131 MMHG | OXYGEN SATURATION: 96 % | RESPIRATION RATE: 16 BRPM | TEMPERATURE: 98 F | DIASTOLIC BLOOD PRESSURE: 83 MMHG

## 2025-03-27 DIAGNOSIS — R42 ORTHOSTATIC DIZZINESS: Primary | ICD-10-CM

## 2025-03-27 DIAGNOSIS — R42 VERTIGO: ICD-10-CM

## 2025-03-27 LAB
ALBUMIN SERPL-MCNC: 4.2 G/DL (ref 3.5–5.2)
ALBUMIN/GLOB SERPL: 1.1 G/DL
ALP SERPL-CCNC: 92 U/L (ref 39–117)
ALT SERPL W P-5'-P-CCNC: 25 U/L (ref 1–33)
ANION GAP SERPL CALCULATED.3IONS-SCNC: 11 MMOL/L (ref 5–15)
AST SERPL-CCNC: 21 U/L (ref 1–32)
BASOPHILS # BLD AUTO: 0.07 10*3/MM3 (ref 0–0.2)
BASOPHILS NFR BLD AUTO: 0.7 % (ref 0–1.5)
BILIRUB SERPL-MCNC: 0.2 MG/DL (ref 0–1.2)
BUN SERPL-MCNC: 15 MG/DL (ref 6–20)
BUN/CREAT SERPL: 20.5 (ref 7–25)
CALCIUM SPEC-SCNC: 9.3 MG/DL (ref 8.6–10.5)
CHLORIDE SERPL-SCNC: 102 MMOL/L (ref 98–107)
CO2 SERPL-SCNC: 24 MMOL/L (ref 22–29)
CREAT SERPL-MCNC: 0.73 MG/DL (ref 0.57–1)
DEPRECATED RDW RBC AUTO: 41.1 FL (ref 37–54)
EGFRCR SERPLBLD CKD-EPI 2021: 99.1 ML/MIN/1.73
EOSINOPHIL # BLD AUTO: 0.16 10*3/MM3 (ref 0–0.4)
EOSINOPHIL NFR BLD AUTO: 1.5 % (ref 0.3–6.2)
ERYTHROCYTE [DISTWIDTH] IN BLOOD BY AUTOMATED COUNT: 13.2 % (ref 12.3–15.4)
GLOBULIN UR ELPH-MCNC: 3.9 GM/DL
GLUCOSE SERPL-MCNC: 99 MG/DL (ref 65–99)
HCT VFR BLD AUTO: 40.2 % (ref 34–46.6)
HGB BLD-MCNC: 13.5 G/DL (ref 12–15.9)
IMM GRANULOCYTES # BLD AUTO: 0.03 10*3/MM3 (ref 0–0.05)
IMM GRANULOCYTES NFR BLD AUTO: 0.3 % (ref 0–0.5)
LYMPHOCYTES # BLD AUTO: 3.76 10*3/MM3 (ref 0.7–3.1)
LYMPHOCYTES NFR BLD AUTO: 35.7 % (ref 19.6–45.3)
MAGNESIUM SERPL-MCNC: 2.2 MG/DL (ref 1.6–2.6)
MCH RBC QN AUTO: 28.8 PG (ref 26.6–33)
MCHC RBC AUTO-ENTMCNC: 33.6 G/DL (ref 31.5–35.7)
MCV RBC AUTO: 85.9 FL (ref 79–97)
MONOCYTES # BLD AUTO: 0.84 10*3/MM3 (ref 0.1–0.9)
MONOCYTES NFR BLD AUTO: 8 % (ref 5–12)
NEUTROPHILS NFR BLD AUTO: 5.67 10*3/MM3 (ref 1.7–7)
NEUTROPHILS NFR BLD AUTO: 53.8 % (ref 42.7–76)
NRBC BLD AUTO-RTO: 0 /100 WBC (ref 0–0.2)
PLATELET # BLD AUTO: 230 10*3/MM3 (ref 140–450)
PMV BLD AUTO: 11.8 FL (ref 6–12)
POTASSIUM SERPL-SCNC: 4.1 MMOL/L (ref 3.5–5.2)
PROT SERPL-MCNC: 8.1 G/DL (ref 6–8.5)
RBC # BLD AUTO: 4.68 10*6/MM3 (ref 3.77–5.28)
SODIUM SERPL-SCNC: 137 MMOL/L (ref 136–145)
WBC NRBC COR # BLD AUTO: 10.53 10*3/MM3 (ref 3.4–10.8)

## 2025-03-27 PROCEDURE — 25010000002 DIAZEPAM PER 5 MG: Performed by: FAMILY MEDICINE

## 2025-03-27 PROCEDURE — 80053 COMPREHEN METABOLIC PANEL: CPT | Performed by: FAMILY MEDICINE

## 2025-03-27 PROCEDURE — 25010000002 METHYLPREDNISOLONE PER 125 MG: Performed by: FAMILY MEDICINE

## 2025-03-27 PROCEDURE — 36415 COLL VENOUS BLD VENIPUNCTURE: CPT

## 2025-03-27 PROCEDURE — 25810000003 SODIUM CHLORIDE 0.9 % SOLUTION: Performed by: FAMILY MEDICINE

## 2025-03-27 PROCEDURE — 25010000002 ONDANSETRON PER 1 MG: Performed by: FAMILY MEDICINE

## 2025-03-27 PROCEDURE — 96375 TX/PRO/DX INJ NEW DRUG ADDON: CPT

## 2025-03-27 PROCEDURE — 85025 COMPLETE CBC W/AUTO DIFF WBC: CPT | Performed by: FAMILY MEDICINE

## 2025-03-27 PROCEDURE — 83735 ASSAY OF MAGNESIUM: CPT | Performed by: FAMILY MEDICINE

## 2025-03-27 PROCEDURE — 96374 THER/PROPH/DIAG INJ IV PUSH: CPT

## 2025-03-27 RX ORDER — SODIUM CHLORIDE 0.9 % (FLUSH) 0.9 %
10 SYRINGE (ML) INJECTION AS NEEDED
Status: DISCONTINUED | OUTPATIENT
Start: 2025-03-27 | End: 2025-03-27 | Stop reason: HOSPADM

## 2025-03-27 RX ORDER — MECLIZINE HYDROCHLORIDE 25 MG/1
12.5 TABLET ORAL 3 TIMES DAILY PRN
Qty: 21 TABLET | Refills: 0 | Status: SHIPPED | OUTPATIENT
Start: 2025-03-27 | End: 2025-04-03

## 2025-03-27 RX ORDER — METHYLPREDNISOLONE SODIUM SUCCINATE 125 MG/2ML
125 INJECTION, POWDER, LYOPHILIZED, FOR SOLUTION INTRAMUSCULAR; INTRAVENOUS ONCE
Status: COMPLETED | OUTPATIENT
Start: 2025-03-27 | End: 2025-03-27

## 2025-03-27 RX ORDER — ONDANSETRON 2 MG/ML
4 INJECTION INTRAMUSCULAR; INTRAVENOUS ONCE
Status: COMPLETED | OUTPATIENT
Start: 2025-03-27 | End: 2025-03-27

## 2025-03-27 RX ORDER — DIAZEPAM 10 MG/2ML
5 INJECTION, SOLUTION INTRAMUSCULAR; INTRAVENOUS ONCE
Status: COMPLETED | OUTPATIENT
Start: 2025-03-27 | End: 2025-03-27

## 2025-03-27 RX ADMIN — METHYLPREDNISOLONE SODIUM SUCCINATE 125 MG: 125 INJECTION, POWDER, FOR SOLUTION INTRAMUSCULAR; INTRAVENOUS at 02:30

## 2025-03-27 RX ADMIN — SODIUM CHLORIDE 1000 ML: 9 INJECTION, SOLUTION INTRAVENOUS at 02:31

## 2025-03-27 RX ADMIN — DIAZEPAM 5 MG: 5 INJECTION, SOLUTION INTRAMUSCULAR; INTRAVENOUS at 02:31

## 2025-03-27 RX ADMIN — ONDANSETRON 4 MG: 2 INJECTION INTRAMUSCULAR; INTRAVENOUS at 02:30

## 2025-03-27 NOTE — ED PROVIDER NOTES
HPI:    Patient is a 54-year-old  female presents to the emergency room with dizziness.  No trauma.  She had something similar to this in 2017 with vertigo and she received Valium,Solu-Medrol, and Zofran.  Since that time she had not had another symptom similar to this until he last 2 days.  There is positive nausea.  There has been attempted to do the Epley's maneuver at home for the vertigo with no improvement.  In the past she was trying to take medication over-the-counter like Dramamine but it only made her sleepy and did not help her symptoms.  No recent illness no cough congestion runny nose.  No trauma.  No fevers chills or night sweats.  Patient states that dizziness gets worse when she stands up and changes position.      REVIEW OF SYSTEMS    CONSTITUTIONAL:  No complaints of fever, chills,or weakness  EYES:  No complaints of discharge   ENT: No complaints of sore throat or ear pain  CARDIOVASCULAR:  No complaints of chest pain, palpitations, or swelling  RESPIRATORY:  No complaints of cough or shortness of breath  GI:  No complaints of abdominal pain, nausea, vomiting, or diarrhea  MUSCULOSKELETAL:  No complaints of back pain  SKIN:  No complaints of rash  NEUROLOGIC: Positive for feeling of the room spinning and dizziness worse with standing.    ENDOCRINE:  No complaints of polyuria or polydipsia  LYMPHATIC:  No complaints of swollen glands  GENITOURINARY: No complaints of urinary frequency or hematuria        PAST MEDICAL HISTORY  Past Medical History:   Diagnosis Date    Depression     Elevated cholesterol     Hypertension     Vertigo        FAMILY HISTORY  Family History   Problem Relation Age of Onset    Diabetes Mother     Liver cancer Father     Colon cancer Neg Hx     Colon polyps Neg Hx     Esophageal cancer Neg Hx        SOCIAL HISTORY  Social History     Socioeconomic History    Marital status:    Tobacco Use    Smoking status: Never     Passive exposure: Never    Smokeless  "tobacco: Never   Vaping Use    Vaping status: Never Used   Substance and Sexual Activity    Alcohol use: No    Drug use: No    Sexual activity: Defer       IMMUNIZATION HISTORY  Deferred to primary care physician.    SURGICAL HISTORY  Past Surgical History:   Procedure Laterality Date    COLONOSCOPY N/A 3/25/2025    Procedure: COLONOSCOPY WITH ANESTHESIA- (examination of colon);  Surgeon: Roland Cruz DO;  Location: [facility] ENDOSCOPY;  Service: Gastroenterology;  Laterality: N/A;  pre op:Change in bowel habit  post op: normal  pcp: n/a    NO PAST SURGERIES         CURRENT MEDICATIONS    Current Facility-Administered Medications:     [COMPLETED] Insert Peripheral IV, , , Once **AND** sodium chloride 0.9 % flush 10 mL, 10 mL, Intravenous, PRN, Harvey Covington Jr., MD    Current Outpatient Medications:     linaclotide (Linzess) 72 MCG capsule capsule, Take 1 capsule by mouth Every Morning Before Breakfast. (Patient not taking: Reported on 2/13/2025), Disp: 30 capsule, Rfl: 5    lisinopril (PRINIVIL,ZESTRIL) 10 MG tablet, TAKE 1 TABLET BY MOUTH DAILY., Disp: 30 tablet, Rfl: 5    meclizine (ANTIVERT) 25 MG tablet, Take 0.5 tablets by mouth 3 (Three) Times a Day As Needed for Dizziness or Nausea for up to 7 days., Disp: 21 tablet, Rfl: 0    rosuvastatin (CRESTOR) 20 MG tablet, TAKE 1 TABLET BY MOUTH EVERY NIGHT., Disp: 30 tablet, Rfl: 5    sertraline (ZOLOFT) 25 MG tablet, TAKE 1 TABLET BY MOUTH DAILY., Disp: 30 tablet, Rfl: 2    ALLERGIES  Allergies   Allergen Reactions    Penicillins Anxiety       Neuro exam    VITAL SIGNS:   /89 (BP Location: Left arm, Patient Position: Sitting)   Pulse 57   Temp 98.2 °F (36.8 °C) (Oral)   Resp 20   Ht 157.5 cm (62\")   Wt 66 kg (145 lb 8.1 oz)   LMP 06/07/2023 (Within Months)   SpO2 99%   BMI 26.61 kg/m²     Constitutional: Patient is alert and in no distress.  Patient with generalized body  discomfort.    Eyes: EOMI PERRLA intact.  With photophobia.    ENT: " There is a normal pharynx with no acute erythema or exudate and oral mucosa is moist.  Nose is clear with no drainage.  Tympanic membranes intact and non-erythemic.    Cardiovascular: S1-S2 regular rate and rhythm.  No murmurs, rubs or gallops are noted.  Position this is worsened by orthostasis.    Respiratory: Patient is clear to auscultation bilaterally with no wheezing or rhonchi.  Chest wall is nontender.  There are no external lesions on the chest.  There is no crepitance.    Abdomen: Soft, nontender.  Bowel sounds are normal in all 4 quadrants. There is no rebound or guarding noted.  There is no abdominal distention or hepatosplenomegaly.    Genitourinary: Patient is voiding appropriately.    Integument: No acute lesions noted.  Color appears to be normal.    Neurological: CNS 2 through 12 grossly intact no focal deficit strength is 5+ in bilateral upper and lower extremity face and trunk.    Jenni Coma Scale: 15      RADIOLOGY/PROCEDURES      No orders to display           FUTURE APPOINTMENTS     No future appointments.     COURSE & MEDICAL DECISION MAKING     Patient's partial differential diagnosis can include:    Benign positional vertigo, other vertigo, electrolyte abnormality, orthostatic hypotension, arrhythmia, dehydration, acute kidney failure, brain tumor, and others    Patient's symptoms greatly improved after IV Valium, Zofran, and Solu-Medrol.  Patient is able to rest comfortably and the dizziness is greatly improved.  Patient also received a liter of normal saline which also helped her orthostasis and made her blood pressure improved      The patient and family members feel comfortable with going home and they would like to try the meclizine for the patient's dizziness I did advise that this could cause dehydration and is very important to increase the hydration.    Patient's level of risk: No        CRITICAL CARE    CRITICAL CARE: No  CRITICAL CARE TIME: None        Also Old charts were  reviewed per [facility] EMR.  Pertinent details are summarized above.  All laboratory, radiologic, and EKG studies that were performed in the Emergency Department were a necessary part of the evaluation needed to exclude unstable or  emergent medical conditions:     Patient was hemodynamically and neurologically stable in the ED.   Pertinent studies were reviewed as above.     Recent Results (from the past 24 hours)   ECG 12 Lead Other; dizziness    Collection Time: 03/26/25 11:45 PM   Result Value Ref Range    QT Interval 404 ms    QTC Interval 413 ms   Comprehensive Metabolic Panel    Collection Time: 03/27/25  2:28 AM    Specimen: Arm, Left; Blood   Result Value Ref Range    Glucose 99 65 - 99 mg/dL    BUN 15 6 - 20 mg/dL    Creatinine 0.73 0.57 - 1.00 mg/dL    Sodium 137 136 - 145 mmol/L    Potassium 4.1 3.5 - 5.2 mmol/L    Chloride 102 98 - 107 mmol/L    CO2 24.0 22.0 - 29.0 mmol/L    Calcium 9.3 8.6 - 10.5 mg/dL    Total Protein 8.1 6.0 - 8.5 g/dL    Albumin 4.2 3.5 - 5.2 g/dL    ALT (SGPT) 25 1 - 33 U/L    AST (SGOT) 21 1 - 32 U/L    Alkaline Phosphatase 92 39 - 117 U/L    Total Bilirubin 0.2 0.0 - 1.2 mg/dL    Globulin 3.9 gm/dL    A/G Ratio 1.1 g/dL    BUN/Creatinine Ratio 20.5 7.0 - 25.0    Anion Gap 11.0 5.0 - 15.0 mmol/L    eGFR 99.1 >60.0 mL/min/1.73   Magnesium    Collection Time: 03/27/25  2:28 AM    Specimen: Arm, Left; Blood   Result Value Ref Range    Magnesium 2.2 1.6 - 2.6 mg/dL   CBC Auto Differential    Collection Time: 03/27/25  2:28 AM    Specimen: Arm, Left; Blood   Result Value Ref Range    WBC 10.53 3.40 - 10.80 10*3/mm3    RBC 4.68 3.77 - 5.28 10*6/mm3    Hemoglobin 13.5 12.0 - 15.9 g/dL    Hematocrit 40.2 34.0 - 46.6 %    MCV 85.9 79.0 - 97.0 fL    MCH 28.8 26.6 - 33.0 pg    MCHC 33.6 31.5 - 35.7 g/dL    RDW 13.2 12.3 - 15.4 %    RDW-SD 41.1 37.0 - 54.0 fl    MPV 11.8 6.0 - 12.0 fL    Platelets 230 140 - 450 10*3/mm3    Neutrophil % 53.8 42.7 - 76.0 %    Lymphocyte % 35.7 19.6 - 45.3 %     Monocyte % 8.0 5.0 - 12.0 %    Eosinophil % 1.5 0.3 - 6.2 %    Basophil % 0.7 0.0 - 1.5 %    Immature Grans % 0.3 0.0 - 0.5 %    Neutrophils, Absolute 5.67 1.70 - 7.00 10*3/mm3    Lymphocytes, Absolute 3.76 (H) 0.70 - 3.10 10*3/mm3    Monocytes, Absolute 0.84 0.10 - 0.90 10*3/mm3    Eosinophils, Absolute 0.16 0.00 - 0.40 10*3/mm3    Basophils, Absolute 0.07 0.00 - 0.20 10*3/mm3    Immature Grans, Absolute 0.03 0.00 - 0.05 10*3/mm3    nRBC 0.0 0.0 - 0.2 /100 WBC         The patient received:  Medications   sodium chloride 0.9 % flush 10 mL (has no administration in time range)   diazePAM (VALIUM) injection 5 mg (5 mg Intravenous Given 3/27/25 0231)   methylPREDNISolone sodium succinate (SOLU-Medrol) injection 125 mg (125 mg Intravenous Given 3/27/25 0230)   sodium chloride 0.9 % bolus 1,000 mL (1,000 mL Intravenous New Bag 3/27/25 0231)   ondansetron (ZOFRAN) injection 4 mg (4 mg Intravenous Given 3/27/25 0230)              ED Disposition       ED Disposition   Discharge    Condition   Stable    Comment   --                   Dragon disclaimer:  Part of this note may be an electronic transcription/translation of spoken language to printed text using the Dragon Dictation System.     This information is consistent with my knowledge of the patient’s controlled substance use history.      FINAL IMPRESSION   Diagnosis Plan   1. Orthostatic dizziness        2. Vertigo              MD Adria Man Jr, Thomas Mark Jr., MD  03/27/25 [id number]

## 2025-04-02 LAB
QT INTERVAL: 404 MS
QTC INTERVAL: 413 MS

## 2025-04-15 ENCOUNTER — OFFICE VISIT (OUTPATIENT)
Dept: FAMILY MEDICINE CLINIC | Facility: CLINIC | Age: 55
End: 2025-04-15

## 2025-04-15 VITALS
OXYGEN SATURATION: 97 % | WEIGHT: 152.8 LBS | HEART RATE: 68 BPM | BODY MASS INDEX: 28.12 KG/M2 | HEIGHT: 62 IN | TEMPERATURE: 97.1 F | SYSTOLIC BLOOD PRESSURE: 128 MMHG | DIASTOLIC BLOOD PRESSURE: 86 MMHG

## 2025-04-15 DIAGNOSIS — R42 VERTIGO: Primary | ICD-10-CM

## 2025-04-15 RX ORDER — METHYLPREDNISOLONE 4 MG/1
TABLET ORAL
Qty: 1 EACH | Refills: 0 | Status: SHIPPED | OUTPATIENT
Start: 2025-04-15

## 2025-04-15 NOTE — PROGRESS NOTES
"Subjective   Chief Complaint:  Reevaluation and ER visit    History of Present Illness  The patient is a 54-year-old female presenting for reevaluation of vertigo.    She reports that meclizine makes her tired, fatigued, and dizzy all the time. She went to the ER on 03/27/2025. Lying on her left side exacerbates the condition.    Past Medical, Surgical, Social, and Family History:  Allergies   Allergen Reactions    Penicillins Anxiety      Past Medical History:   Diagnosis Date    Depression     Elevated cholesterol     Hypertension     Vertigo       Past Surgical History:   Procedure Laterality Date    COLONOSCOPY N/A 3/25/2025    Procedure: COLONOSCOPY WITH ANESTHESIA- (examination of colon);  Surgeon: Roland Cruz DO;  Location: [facility] ENDOSCOPY;  Service: Gastroenterology;  Laterality: N/A;  pre op:Change in bowel habit  post op: normal  pcp: n/a    NO PAST SURGERIES        Social History     Socioeconomic History    Marital status:    Tobacco Use    Smoking status: Never     Passive exposure: Never    Smokeless tobacco: Never   Vaping Use    Vaping status: Never Used   Substance and Sexual Activity    Alcohol use: No    Drug use: No    Sexual activity: Defer      Family History   Problem Relation Age of Onset    Diabetes Mother     Liver cancer Father     Colon cancer Neg Hx     Colon polyps Neg Hx     Esophageal cancer Neg Hx        Objective   Vital Signs  /86   Pulse 68   Temp 97.1 °F (36.2 °C) (Infrared)   Ht 157.5 cm (62\")   Wt 69.3 kg (152 lb 12.8 oz)   LMP 06/07/2023 (Within Months)   SpO2 97%   BMI 27.95 kg/m²    Physical Exam  Vitals reviewed.   Constitutional:       General: She is not in acute distress.     Appearance: Normal appearance.   HENT:      Ears:      Comments: Bilateral TMs opaque and congested-no redness, postnasal drainage clear  Cardiovascular:      Rate and Rhythm: Normal rate and regular rhythm.   Pulmonary:      Effort: Pulmonary effort is normal.      " Breath sounds: Normal breath sounds.       Assessment & Plan   Assessment & Plan  1. Vertigo.  She reports that meclizine makes her tired, fatigued, and dizzy all the time. Lying on her left side exacerbates the condition. She went to the ER on 03/27/2025. A Medrol Dosepak will be added to her treatment regimen. She has been advised that addressing the ear congestion may lead to an improvement in her vertigo symptoms, potentially eliminating the need for meclizine. If there is no improvement, she should follow up as needed.    Follow-up:  The patient will Return for follow-up as needed.    Records and Results Reviewed:  I reviewed current medications as given by patient and allergy list.    : [employer]- and Dragon Speech Recognition - No recording technology was used in the exam room during encounter.    Electronically signed by VALERIA Ricks, 04/15/25, 2:01 PM CDT.

## 2025-05-21 DIAGNOSIS — F33.1 MODERATE EPISODE OF RECURRENT MAJOR DEPRESSIVE DISORDER: ICD-10-CM

## 2025-05-21 RX ORDER — SERTRALINE HYDROCHLORIDE 25 MG/1
25 TABLET, FILM COATED ORAL DAILY
Qty: 30 TABLET | Refills: 2 | Status: SHIPPED | OUTPATIENT
Start: 2025-05-21

## 2025-06-12 DIAGNOSIS — I10 ESSENTIAL HYPERTENSION: ICD-10-CM

## 2025-06-12 RX ORDER — LISINOPRIL 10 MG/1
10 TABLET ORAL DAILY
Qty: 30 TABLET | Refills: 5 | Status: SHIPPED | OUTPATIENT
Start: 2025-06-12

## 2025-06-23 DIAGNOSIS — F33.1 MODERATE EPISODE OF RECURRENT MAJOR DEPRESSIVE DISORDER: ICD-10-CM

## 2025-06-23 RX ORDER — SERTRALINE HYDROCHLORIDE 25 MG/1
25 TABLET, FILM COATED ORAL DAILY
Qty: 30 TABLET | Refills: 2 | Status: SHIPPED | OUTPATIENT
Start: 2025-06-23

## 2025-06-23 NOTE — TELEPHONE ENCOUNTER
Caller: Haley Jewell    Relationship: Self    Best call back number:     [phone]        Requested Prescriptions:   Requested Prescriptions     Pending Prescriptions Disp Refills    sertraline (ZOLOFT) 25 MG tablet 30 tablet 2     Sig: Take 1 tablet by mouth Daily.        Pharmacy where request should be sent: [facility] - [place], [place] - [address] [facility] [phone] [facility] [phone] [facility]     Last office visit with prescribing clinician: 4/15/2025   Last telemedicine visit with prescribing clinician: Visit date not found   Next office visit with prescribing clinician: Visit date not found     Additional details provided by patient: COMPLETELY OUT    Does the patient have less than a 3 day supply:  (X) Yes  [] No    Would you like a call back once the refill request has been completed: [x] Yes [] No    If the office needs to give you a call back, can they leave a voicemail: [x] Yes [] No    Yasemin Hodges Rep   06/23/25 12:19 CDT

## 2025-07-28 ENCOUNTER — OFFICE VISIT (OUTPATIENT)
Dept: FAMILY MEDICINE CLINIC | Facility: CLINIC | Age: 55
End: 2025-07-28
Payer: COMMERCIAL

## 2025-07-28 VITALS
HEART RATE: 82 BPM | WEIGHT: 155.2 LBS | SYSTOLIC BLOOD PRESSURE: 122 MMHG | BODY MASS INDEX: 28.56 KG/M2 | OXYGEN SATURATION: 98 % | HEIGHT: 62 IN | DIASTOLIC BLOOD PRESSURE: 82 MMHG

## 2025-07-28 DIAGNOSIS — I10 ESSENTIAL HYPERTENSION: ICD-10-CM

## 2025-07-28 DIAGNOSIS — F32.89 OTHER DEPRESSION: ICD-10-CM

## 2025-07-28 DIAGNOSIS — E78.5 HYPERLIPIDEMIA, UNSPECIFIED HYPERLIPIDEMIA TYPE: ICD-10-CM

## 2025-07-28 DIAGNOSIS — M25.562 ACUTE PAIN OF LEFT KNEE: Primary | ICD-10-CM

## 2025-07-28 DIAGNOSIS — M25.561 CHRONIC PAIN OF RIGHT KNEE: ICD-10-CM

## 2025-07-28 DIAGNOSIS — G89.29 CHRONIC PAIN OF RIGHT KNEE: ICD-10-CM

## 2025-07-28 RX ORDER — DEXAMETHASONE SODIUM PHOSPHATE 4 MG/ML
4 INJECTION, SOLUTION INTRA-ARTICULAR; INTRALESIONAL; INTRAMUSCULAR; INTRAVENOUS; SOFT TISSUE ONCE
Status: COMPLETED | OUTPATIENT
Start: 2025-07-28 | End: 2025-07-28

## 2025-07-28 RX ORDER — NAPROXEN 500 MG/1
500 TABLET ORAL 2 TIMES DAILY WITH MEALS
Qty: 28 TABLET | Refills: 0 | Status: SHIPPED | OUTPATIENT
Start: 2025-07-28

## 2025-07-28 RX ADMIN — DEXAMETHASONE SODIUM PHOSPHATE 4 MG: 4 INJECTION, SOLUTION INTRA-ARTICULAR; INTRALESIONAL; INTRAMUSCULAR; INTRAVENOUS; SOFT TISSUE at 15:50

## 2025-07-28 NOTE — PROGRESS NOTES
"Subjective   Chief Complaint:  Knee pain, requesting labs    History of Present Illness  The patient is a 54-year-old female presenting with exacerbation of left knee pain. She has had knee pain in the past and received a steroid injection at orthopedics. She reports that the left knee has been larger than the right and slightly swollen for a few days. She also reports chronic pain in the right knee. Additionally, she reports some low back pain. She has a history of high cholesterol and hypertension. She inquires if she can get regular lab work today. Her blood pressure is stable on lisinopril 10 mg daily and she takes Crestor 20 mg nightly for hyperlipidemia. For chronic depression, she is on Zoloft 25 mg daily and reports doing well.    Past Medical, Surgical, Social, and Family History:  Allergies   Allergen Reactions    Penicillins Anxiety      Past Medical History:   Diagnosis Date    Depression     Elevated cholesterol     Hypertension     Vertigo       Past Surgical History:   Procedure Laterality Date    COLONOSCOPY N/A 3/25/2025    Procedure: COLONOSCOPY WITH ANESTHESIA- (examination of colon);  Surgeon: Roland Cruz DO;  Location: [facility] ENDOSCOPY;  Service: Gastroenterology;  Laterality: N/A;  pre op:Change in bowel habit  post op: normal  pcp: n/a    NO PAST SURGERIES        Social History     Socioeconomic History    Marital status:    Tobacco Use    Smoking status: Never     Passive exposure: Never    Smokeless tobacco: Never   Vaping Use    Vaping status: Never Used   Substance and Sexual Activity    Alcohol use: No    Drug use: No    Sexual activity: Defer      Family History   Problem Relation Age of Onset    Diabetes Mother     Liver cancer Father     Colon cancer Neg Hx     Colon polyps Neg Hx     Esophageal cancer Neg Hx        Objective   Vital Signs  /82   Pulse 82   Ht 157.5 cm (62\")   Wt 70.4 kg (155 lb 3.2 oz)   LMP 06/07/2023 (Within Months)   SpO2 98%   BMI 28.39 " kg/m²    Physical Exam  Vitals reviewed.   Constitutional:       General: She is not in acute distress.     Appearance: Normal appearance.   Cardiovascular:      Rate and Rhythm: Normal rate and regular rhythm.   Pulmonary:      Effort: Pulmonary effort is normal.      Breath sounds: Normal breath sounds.   Musculoskeletal:      Comments: Right knee tender anteriorly, crepitus with flexion and extension bilateral knees.  Left knee swelling present, no visible redness.       Assessment & Plan   Assessment & Plan  1. Acute pain of left knee.  - Reports exacerbation of left knee pain, with swelling for a few days.  - Physical exam findings indicate swelling and pain in the left knee.  - X-ray ordered to evaluate the condition.  - Decadron 4 mg intramuscularly administered today; naproxen 500 mg twice daily for 2 weeks starting tomorrow.    2. Hyperlipidemia.  - History of high cholesterol.  - Lab work including CBC, CMP, TSH, and lipids ordered.  - Continue Crestor 20 mg nightly.    3. Essential hypertension.  - Blood pressure is stable on current medication.  - Continue lisinopril 10 mg daily.    4. Chronic pain of right knee.  - Reports chronic pain in the right knee.  - X-ray three-view of the right knee ordered.    5. Major moderate depression.  - Depression in partial remission.  - Continue Zoloft 25 mg daily.    Follow-up:  The patient will No follow-ups on file.    Records and Results Reviewed:  I reviewed current medications as given by patient and allergy list.    : Hybrid [employer]- and Dragon Speech Recognition - No recording technology was used in the exam room during encounter.    Electronically signed by VALERIA Ricks, 07/28/25, 3:49 PM CDT.

## 2025-07-31 DIAGNOSIS — G89.29 CHRONIC PAIN OF RIGHT KNEE: ICD-10-CM

## 2025-07-31 DIAGNOSIS — M25.562 ACUTE PAIN OF LEFT KNEE: ICD-10-CM

## 2025-07-31 DIAGNOSIS — M25.561 CHRONIC PAIN OF RIGHT KNEE: ICD-10-CM

## 2025-08-14 ENCOUNTER — OFFICE VISIT (OUTPATIENT)
Dept: FAMILY MEDICINE CLINIC | Facility: CLINIC | Age: 55
End: 2025-08-14

## 2025-08-14 VITALS
TEMPERATURE: 98.2 F | HEIGHT: 62 IN | BODY MASS INDEX: 28.16 KG/M2 | OXYGEN SATURATION: 98 % | SYSTOLIC BLOOD PRESSURE: 124 MMHG | WEIGHT: 153 LBS | DIASTOLIC BLOOD PRESSURE: 68 MMHG | HEART RATE: 74 BPM

## 2025-08-14 DIAGNOSIS — R09.81 NASAL CONGESTION: ICD-10-CM

## 2025-08-14 DIAGNOSIS — J01.10 ACUTE FRONTAL SINUSITIS, RECURRENCE NOT SPECIFIED: Primary | ICD-10-CM

## 2025-08-14 DIAGNOSIS — J02.9 SORE THROAT: ICD-10-CM

## 2025-08-14 LAB
EXPIRATION DATE: NORMAL
INTERNAL CONTROL: NORMAL
Lab: NORMAL
SARS-COV-2 AG UPPER RESP QL IA.RAPID: NOT DETECTED

## 2025-08-14 PROCEDURE — 99213 OFFICE O/P EST LOW 20 MIN: CPT | Performed by: NURSE PRACTITIONER

## 2025-08-14 PROCEDURE — 87426 SARSCOV CORONAVIRUS AG IA: CPT | Performed by: NURSE PRACTITIONER

## 2025-08-14 PROCEDURE — 1125F AMNT PAIN NOTED PAIN PRSNT: CPT | Performed by: NURSE PRACTITIONER

## 2025-08-14 RX ORDER — FEXOFENADINE HCL AND PSEUDOEPHEDRINE HCL 180; 240 MG/1; MG/1
1 TABLET, EXTENDED RELEASE ORAL DAILY
Qty: 7 TABLET | Refills: 0 | Status: SHIPPED | OUTPATIENT
Start: 2025-08-14

## 2025-08-14 RX ORDER — DOXYCYCLINE 100 MG/1
100 CAPSULE ORAL 2 TIMES DAILY
Qty: 14 CAPSULE | Refills: 0 | Status: SHIPPED | OUTPATIENT
Start: 2025-08-14 | End: 2025-08-21

## 2025-08-20 DIAGNOSIS — R74.8 ELEVATED LIVER ENZYMES: Primary | ICD-10-CM

## (undated) DEVICE — ARGYLE YANKAUER BULB TIP WITH VENT: Brand: ARGYLE

## (undated) DEVICE — MASK,OXYGEN,MED CONC,ADLT,7' TUB, UC: Brand: PENDING

## (undated) DEVICE — THE CHANNEL CLEANING BRUSH IS A NYLON FLEXI BRUSH ATTACHED TO A FLEXIBLE PLASTIC SHEATH DESIGNED TO SAFELY REMOVE DEBRIS FROM FLEXIBLE ENDOSCOPES.

## (undated) DEVICE — DEFENDO AIR WATER SUCTION AND BIOPSY VALVE KIT FOR  OLYMPUS: Brand: DEFENDO AIR/WATER/SUCTION AND BIOPSY VALVE

## (undated) DEVICE — SENSR O2 OXIMAX FNGR A/ 18IN NONSTR